# Patient Record
Sex: MALE | Race: WHITE | NOT HISPANIC OR LATINO | Employment: OTHER | URBAN - METROPOLITAN AREA
[De-identification: names, ages, dates, MRNs, and addresses within clinical notes are randomized per-mention and may not be internally consistent; named-entity substitution may affect disease eponyms.]

---

## 2022-10-21 DIAGNOSIS — I48.0 PAROXYSMAL ATRIAL FIBRILLATION: ICD-10-CM

## 2022-10-21 DIAGNOSIS — Z95.818 OTHER SPECIFIED CARDIAC DEVICE IN SITU: Primary | ICD-10-CM

## 2022-10-28 ENCOUNTER — TELEPHONE (OUTPATIENT)
Dept: ELECTROPHYSIOLOGY | Facility: CLINIC | Age: 77
End: 2022-10-28
Payer: MEDICARE

## 2022-10-28 RX ORDER — TIZANIDINE 4 MG/1
4 TABLET ORAL NIGHTLY PRN
Status: ON HOLD | COMMUNITY
Start: 2022-05-24 | End: 2023-03-15 | Stop reason: CLARIF

## 2022-10-28 RX ORDER — LISINOPRIL 10 MG/1
10 TABLET ORAL DAILY
COMMUNITY
Start: 2022-08-09

## 2022-10-28 RX ORDER — PROMETHAZINE HYDROCHLORIDE 25 MG/1
25 TABLET ORAL EVERY 8 HOURS PRN
Status: ON HOLD | COMMUNITY
Start: 2022-09-06 | End: 2023-03-15 | Stop reason: CLARIF

## 2022-10-28 RX ORDER — ATORVASTATIN CALCIUM 20 MG/1
20 TABLET, FILM COATED ORAL DAILY
COMMUNITY
Start: 2022-08-11

## 2022-10-28 RX ORDER — OXYCODONE AND ACETAMINOPHEN 5; 325 MG/1; MG/1
1 TABLET ORAL EVERY 6 HOURS PRN
Status: ON HOLD | COMMUNITY
Start: 2022-09-06 | End: 2023-03-15 | Stop reason: CLARIF

## 2022-10-28 RX ORDER — CEPHALEXIN 500 MG/1
CAPSULE ORAL
Status: ON HOLD | COMMUNITY
Start: 2022-05-09 | End: 2023-03-15 | Stop reason: CLARIF

## 2022-11-03 ENCOUNTER — TELEPHONE (OUTPATIENT)
Dept: ELECTROPHYSIOLOGY | Facility: CLINIC | Age: 77
End: 2022-11-03
Payer: MEDICARE

## 2022-11-08 ENCOUNTER — CLINICAL SUPPORT (OUTPATIENT)
Dept: CARDIOLOGY | Facility: HOSPITAL | Age: 77
End: 2022-11-08
Attending: INTERNAL MEDICINE
Payer: MEDICARE

## 2022-11-08 ENCOUNTER — HOSPITAL ENCOUNTER (OUTPATIENT)
Dept: CARDIOLOGY | Facility: CLINIC | Age: 77
Discharge: HOME | End: 2022-11-08
Attending: INTERNAL MEDICINE
Payer: MEDICARE

## 2022-11-08 ENCOUNTER — OFFICE VISIT (OUTPATIENT)
Dept: ELECTROPHYSIOLOGY | Facility: CLINIC | Age: 77
End: 2022-11-08
Payer: MEDICARE

## 2022-11-08 VITALS
HEIGHT: 67 IN | SYSTOLIC BLOOD PRESSURE: 162 MMHG | HEART RATE: 60 BPM | DIASTOLIC BLOOD PRESSURE: 84 MMHG | BODY MASS INDEX: 25.11 KG/M2 | WEIGHT: 160 LBS

## 2022-11-08 DIAGNOSIS — I73.9 PAD (PERIPHERAL ARTERY DISEASE): ICD-10-CM

## 2022-11-08 DIAGNOSIS — Z95.818 OTHER SPECIFIED CARDIAC DEVICE IN SITU: ICD-10-CM

## 2022-11-08 DIAGNOSIS — R55 BLACKOUT SPELL: Primary | ICD-10-CM

## 2022-11-08 DIAGNOSIS — I49.9 CARDIAC ARRHYTHMIA, UNSPECIFIED CARDIAC ARRHYTHMIA TYPE: ICD-10-CM

## 2022-11-08 DIAGNOSIS — I48.0 PAROXYSMAL ATRIAL FIBRILLATION: ICD-10-CM

## 2022-11-08 PROCEDURE — 93291 INTERROG DEV EVAL SCRMS IP: CPT

## 2022-11-08 PROCEDURE — 1125F AMNT PAIN NOTED PAIN PRSNT: CPT | Mod: CPTII,S$GLB,, | Performed by: INTERNAL MEDICINE

## 2022-11-08 PROCEDURE — 3288F FALL RISK ASSESSMENT DOCD: CPT | Mod: CPTII,S$GLB,, | Performed by: INTERNAL MEDICINE

## 2022-11-08 PROCEDURE — 99999 PR PBB SHADOW E&M-EST. PATIENT-LVL III: CPT | Mod: PBBFAC,,, | Performed by: INTERNAL MEDICINE

## 2022-11-08 PROCEDURE — 3077F SYST BP >= 140 MM HG: CPT | Mod: CPTII,S$GLB,, | Performed by: INTERNAL MEDICINE

## 2022-11-08 PROCEDURE — 1101F PR PT FALLS ASSESS DOC 0-1 FALLS W/OUT INJ PAST YR: ICD-10-PCS | Mod: CPTII,S$GLB,, | Performed by: INTERNAL MEDICINE

## 2022-11-08 PROCEDURE — 3079F DIAST BP 80-89 MM HG: CPT | Mod: CPTII,S$GLB,, | Performed by: INTERNAL MEDICINE

## 2022-11-08 PROCEDURE — 1159F MED LIST DOCD IN RCRD: CPT | Mod: CPTII,S$GLB,, | Performed by: INTERNAL MEDICINE

## 2022-11-08 PROCEDURE — 1160F RVW MEDS BY RX/DR IN RCRD: CPT | Mod: CPTII,S$GLB,, | Performed by: INTERNAL MEDICINE

## 2022-11-08 PROCEDURE — 1125F PR PAIN SEVERITY QUANTIFIED, PAIN PRESENT: ICD-10-PCS | Mod: CPTII,S$GLB,, | Performed by: INTERNAL MEDICINE

## 2022-11-08 PROCEDURE — 1159F PR MEDICATION LIST DOCUMENTED IN MEDICAL RECORD: ICD-10-PCS | Mod: CPTII,S$GLB,, | Performed by: INTERNAL MEDICINE

## 2022-11-08 PROCEDURE — 93010 ELECTROCARDIOGRAM REPORT: CPT | Mod: S$GLB,,, | Performed by: INTERNAL MEDICINE

## 2022-11-08 PROCEDURE — 93005 RHYTHM STRIP: ICD-10-PCS | Mod: S$GLB,,, | Performed by: INTERNAL MEDICINE

## 2022-11-08 PROCEDURE — 1160F PR REVIEW ALL MEDS BY PRESCRIBER/CLIN PHARMACIST DOCUMENTED: ICD-10-PCS | Mod: CPTII,S$GLB,, | Performed by: INTERNAL MEDICINE

## 2022-11-08 PROCEDURE — 3079F PR MOST RECENT DIASTOLIC BLOOD PRESSURE 80-89 MM HG: ICD-10-PCS | Mod: CPTII,S$GLB,, | Performed by: INTERNAL MEDICINE

## 2022-11-08 PROCEDURE — 99999 PR PBB SHADOW E&M-EST. PATIENT-LVL III: ICD-10-PCS | Mod: PBBFAC,,, | Performed by: INTERNAL MEDICINE

## 2022-11-08 PROCEDURE — 3288F PR FALLS RISK ASSESSMENT DOCUMENTED: ICD-10-PCS | Mod: CPTII,S$GLB,, | Performed by: INTERNAL MEDICINE

## 2022-11-08 PROCEDURE — 1101F PT FALLS ASSESS-DOCD LE1/YR: CPT | Mod: CPTII,S$GLB,, | Performed by: INTERNAL MEDICINE

## 2022-11-08 PROCEDURE — 93005 ELECTROCARDIOGRAM TRACING: CPT | Mod: S$GLB,,, | Performed by: INTERNAL MEDICINE

## 2022-11-08 PROCEDURE — 93010 RHYTHM STRIP: ICD-10-PCS | Mod: S$GLB,,, | Performed by: INTERNAL MEDICINE

## 2022-11-08 PROCEDURE — 3077F PR MOST RECENT SYSTOLIC BLOOD PRESSURE >= 140 MM HG: ICD-10-PCS | Mod: CPTII,S$GLB,, | Performed by: INTERNAL MEDICINE

## 2022-11-08 PROCEDURE — 99205 OFFICE O/P NEW HI 60 MIN: CPT | Mod: S$GLB,,, | Performed by: INTERNAL MEDICINE

## 2022-11-08 PROCEDURE — 99205 PR OFFICE/OUTPT VISIT, NEW, LEVL V, 60-74 MIN: ICD-10-PCS | Mod: S$GLB,,, | Performed by: INTERNAL MEDICINE

## 2022-11-08 RX ORDER — ASPIRIN 81 MG/1
81 TABLET ORAL
COMMUNITY

## 2022-11-08 RX ORDER — SILDENAFIL 100 MG/1
100 TABLET, FILM COATED ORAL DAILY PRN
COMMUNITY

## 2022-11-08 RX ORDER — LORATADINE 10 MG/1
10 TABLET ORAL DAILY PRN
Status: ON HOLD | COMMUNITY
End: 2023-03-15 | Stop reason: CLARIF

## 2022-11-08 NOTE — PROGRESS NOTES
Subjective:    Patient ID:  Johnie Vinson is a 77 y.o. male who presents for evaluation of PM consideration      77 yoM HTN, PVD here for second opinion regarding need for PM. He had near syncope leading to ILR implantation by Dr Tony Douglas with [facility]. He had nocturnal Mobitz I second degree AV block noted. There was discussion about PM implantation on his note 9/29/22. Given that there was nocturnal only AV block, no PM was planned. He was recently diagnosed with RED, CPAP not started. He has not had a syncopal event since ILR implant 5/22. Normal EF.     Stress Echo 11/16: No ischemia  NM Stress 2017: No ischemia , nl EF  Echo 5/22: Normal EF 55-60%  ILR implanted 5/19/22    No past medical history on file.    No past surgical history on file.    Social History    Socioeconomic History      Marital status:     No family history on file.    Current Outpatient Medications:  atorvastatin (LIPITOR) 20 MG tablet, Take 20 mg by mouth once daily., Disp: , Rfl:   cephALEXin (KEFLEX) 500 MG capsule, Take by mouth., Disp: , Rfl:   lisinopriL 10 MG tablet, Take 10 mg by mouth once daily., Disp: , Rfl:   oxyCODONE-acetaminophen (PERCOCET) 5-325 mg per tablet, Take 1 tablet by mouth every 6 (six) hours as needed., Disp: , Rfl:   promethazine (PHENERGAN) 25 MG tablet, Take 25 mg by mouth every 8 (eight) hours as needed., Disp: , Rfl:   tiZANidine (ZANAFLEX) 4 MG tablet, Take 4 mg by mouth nightly as needed., Disp: , Rfl:     No current facility-administered medications for this visit.    Review of Systems   Constitutional: Negative.   HENT: Negative.     Eyes: Negative.    Cardiovascular:  Positive for syncope. Negative for chest pain, dyspnea on exertion, leg swelling, near-syncope and palpitations.   Respiratory: Negative.  Negative for shortness of breath.    Endocrine: Negative.    Hematologic/Lymphatic: Negative.    Skin: Negative.    Musculoskeletal: Negative.    Gastrointestinal:  Negative.    Genitourinary: Negative.    Neurological:  Negative for dizziness and light-headedness.   Psychiatric/Behavioral: Negative.     Allergic/Immunologic: Negative.       Objective:    Physical Exam  Vitals reviewed.   Constitutional:       General: He is not in acute distress.     Appearance: He is well-developed.   HENT:      Head: Normocephalic and atraumatic.   Eyes:      Pupils: Pupils are equal, round, and reactive to light.   Neck:      Thyroid: No thyromegaly.      Vascular: No JVD.   Cardiovascular:      Rate and Rhythm: Normal rate and regular rhythm.      Chest Wall: PMI is not displaced.      Heart sounds: Normal heart sounds, S1 normal and S2 normal. No murmur heard.    No friction rub. No gallop.   Pulmonary:      Effort: Pulmonary effort is normal. No respiratory distress.      Breath sounds: Normal breath sounds. No wheezing or rales.   Abdominal:      General: Bowel sounds are normal. There is no distension.      Palpations: Abdomen is soft.      Tenderness: There is no abdominal tenderness. There is no guarding or rebound.   Musculoskeletal:         General: No tenderness. Normal range of motion.      Cervical back: Normal range of motion.   Skin:     General: Skin is warm and dry.      Findings: No erythema or rash.   Neurological:      Mental Status: He is alert and oriented to person, place, and time.      Cranial Nerves: No cranial nerve deficit.   Psychiatric:         Behavior: Behavior normal.         Thought Content: Thought content normal.         Judgment: Judgment normal.     ECG: NSR  ms        Assessment:       1. Blackout spell    2. Cardiac arrhythmia, unspecified cardiac arrhythmia type    3. PAD (peripheral artery disease)         Plan:       77 yoM here for syncope evaluation. He has ILR in place with no arrhythmias outside nocturnal AV block. No syncope since then as well. Would continue to monitor with ILR. He wishes to transfer care to [facility] [place]. [facility]

## 2022-11-08 NOTE — Clinical Note
This patient has a Medtronic ILR placed by Dr Douglas for syncope. He wants to transfer care to the  clinic. I plan to see him there in .

## 2022-12-29 ENCOUNTER — TELEPHONE (OUTPATIENT)
Dept: CARDIOLOGY | Facility: CLINIC | Age: 77
End: 2022-12-29
Payer: MEDICARE

## 2022-12-29 NOTE — TELEPHONE ENCOUNTER
Pt said that ever has contacted him and it was handled pb    ----- Message from Carmen Ruiz MA sent at 12/28/2022  4:20 PM CST -----  Regarding: FW: please call    ----- Message -----  From: Kate Aguilar  Sent: 12/28/2022  10:14 AM CST  To: Ananth Kaufman Staff  Subject: please call                                      The pt is calling about his appt, because it's not correct. Please call him back @ [phone]. Thanks, Kate

## 2023-01-03 ENCOUNTER — HOSPITAL ENCOUNTER (OUTPATIENT)
Dept: CARDIOLOGY | Facility: HOSPITAL | Age: 78
Discharge: HOME | End: 2023-01-03
Attending: INTERNAL MEDICINE
Payer: MEDICARE

## 2023-01-03 ENCOUNTER — TELEPHONE (OUTPATIENT)
Dept: PULMONOLOGY | Facility: CLINIC | Age: 78
End: 2023-01-03
Payer: MEDICARE

## 2023-01-03 DIAGNOSIS — G47.33 OSA (OBSTRUCTIVE SLEEP APNEA): ICD-10-CM

## 2023-01-03 DIAGNOSIS — R55 SYNCOPE AND COLLAPSE: Primary | ICD-10-CM

## 2023-01-03 DIAGNOSIS — Z95.818 OTHER SPECIFIED CARDIAC DEVICE IN SITU: ICD-10-CM

## 2023-01-03 DIAGNOSIS — I48.0 PAROXYSMAL ATRIAL FIBRILLATION: ICD-10-CM

## 2023-01-03 PROCEDURE — 93285 PRGRMG DEV EVAL SCRMS IP: CPT | Mod: 26,,, | Performed by: INTERNAL MEDICINE

## 2023-01-03 PROCEDURE — 93285 CARDIAC DEVICE CHECK - IN CLINIC & HOSPITAL: ICD-10-PCS | Mod: 26,,, | Performed by: INTERNAL MEDICINE

## 2023-01-03 NOTE — TELEPHONE ENCOUNTER
----- Message from Julio Romeo sent at 1/3/2023  1:01 PM CST -----  Review Chart,HSAT     Graft Donor Site Bandage (Optional-Leave Blank If You Don't Want In Note): Steri-strips and a pressure bandage were applied to the donor site.

## 2023-01-04 ENCOUNTER — TELEPHONE (OUTPATIENT)
Dept: CARDIOLOGY | Facility: CLINIC | Age: 78
End: 2023-01-04
Payer: MEDICARE

## 2023-01-04 NOTE — TELEPHONE ENCOUNTER
Spoke with patient to inform him that Ms Irving May had place da sleep study order and also to let the patient know that he does not need a pacemaker at this time, Per Maria Fernanda Gomez.-BR

## 2023-01-17 ENCOUNTER — HOSPITAL ENCOUNTER (OUTPATIENT)
Dept: SLEEP MEDICINE | Facility: HOSPITAL | Age: 78
Discharge: HOME | End: 2023-01-17
Attending: NURSE PRACTITIONER
Payer: MEDICARE

## 2023-01-17 DIAGNOSIS — R55 SYNCOPE AND COLLAPSE: ICD-10-CM

## 2023-01-17 DIAGNOSIS — G47.33 OSA (OBSTRUCTIVE SLEEP APNEA): Primary | ICD-10-CM

## 2023-01-17 PROCEDURE — 95800 SLP STDY UNATTENDED: CPT | Mod: 26,,, | Performed by: INTERNAL MEDICINE

## 2023-01-17 PROCEDURE — 95800 PR SLEEP STUDY, UNATTENDED, RECORD HEART RATE/O2 SAT/RESP ANAL/SLEEP TIME: ICD-10-PCS | Mod: 26,,, | Performed by: INTERNAL MEDICINE

## 2023-01-17 PROCEDURE — 95806 SLEEP STUDY UNATT&RESP EFFT: CPT | Performed by: INTERNAL MEDICINE

## 2023-01-17 NOTE — Clinical Note
PHYSICIAN INTERPRETATION AND COMMENTS: Findings are consistent with moderate, positional obstructive sleep apnea(RED). Therapy with CPAP recommended, please refer to sleep disorders clinic CLINICAL HISTORY: 77 year old male presented with: 16 inch neck, BMI of 25.1, an [place] sleepiness score of 2, and history of hypertension. Based on the clinical history, the patient has a high pre-test probability of having Moderate RED.

## 2023-01-18 PROBLEM — R55 SYNCOPE AND COLLAPSE: Status: ACTIVE | Noted: 2023-01-18

## 2023-01-18 NOTE — PROCEDURES
PHYSICIAN INTERPRETATION AND COMMENTS: Findings are consistent with moderate, positional obstructive sleep  apnea(RED). Therapy with CPAP recommended, please refer to sleep disorders clinic  CLINICAL HISTORY: 77 year old male presented with: 16 inch neck, BMI of 25.1, an [place] sleepiness score of 2, and history  of hypertension. Based on the clinical history, the patient has a high pre-test probability of having Moderate RED.  SLEEP STUDY FINDINGS: Patient underwent a 2 night Home Sleep Test and by behavioral criteria, slept for approximately  12.2 hours, with a sleep latency of 18 minutes and a sleep efficiency of 91.6%. Moderate sleep disordered breathing (AHI=21)  is noted based on a 4% hypopnea desaturation criteria, predominantly in the supine position (36 events/hour). The patient  slept supine 43.8% of the night based on valid recording time of 12.73 hours and is 4 times as likely to have  apneas/hypopneas when supine. When considering more subtle measures of sleep disordered breathing, the overall  respiratory disturbance index is moderate(RDI=29) based on a 1% hypopnea desaturation criteria with confirmation by  surrogate arousal indicators. The apneas/hypopneas are accompanied by minimal oxygen desaturation (percent time  below 90% SpO2: 0.5%, Min SpO2: 85.2%). The average desaturation across all sleep disordered breathing events is 2.9%.  Snoring occurs for 7.9% (30 dB) of the study, 3% is very loud. The mean pulse rate is 55 BPM, with frequent pulse rate  variability (46 events with >= 6 BPM increase/decrease per hour).  TREATMENT CONSIDERATIONS: Consider nasal continuous positive airway pressure (CPAP/AutoPAP) as the initial  treatment choice based on the AHI severity and co-morbidities. A mandibular advancement splint (MAS) or referral to an  ENT surgeon for modification to the airway should be considered to reduce the potential contribution of RED on existing  diseases if the patient prefers an  alternative therapy or the CPAP trial is unsuccessful. Based on the RED Supine data in the  study, a Mandibular Advancement Splint (MAS) will likely provide treatment benefit independent of RED severity. The  patient should avoid sleeping supine; the non-supine AHI is 4 times less severe than the supine AHI.

## 2023-01-31 ENCOUNTER — TELEPHONE (OUTPATIENT)
Dept: CARDIOLOGY | Facility: CLINIC | Age: 78
End: 2023-01-31
Payer: MEDICARE

## 2023-01-31 NOTE — TELEPHONE ENCOUNTER
Left a message that we were returning his call pb    ----- Message from Carmen Ruiz MA sent at 1/31/2023 12:25 PM CST -----  Contact: [phone]    ----- Message -----  From: Veronica Weir  Sent: 1/31/2023  10:52 AM CST  To: Ananth Kaufman Staff    Patient would like to consult with a nurse in regards to a monitoring device and a sleep study. Please call back at [phone]. Thanks KD

## 2023-02-01 ENCOUNTER — TELEPHONE (OUTPATIENT)
Dept: CARDIOLOGY | Facility: HOSPITAL | Age: 78
End: 2023-02-01
Payer: MEDICARE

## 2023-02-01 NOTE — TELEPHONE ENCOUNTER
Explained to patient that his appointment on 2/2 was remote monitor transmission from his Medtronic loop recorder.  No actions necessary on his part, the transmission will take place automatically while he is sleeping.  Pt does not need to come to clinic for this visit.    Patient also wants cardiology to place order for his CPAP machine.  Advised that he is scheduled to see Dr. ANNE Becker on 3/21 and the orders would be placed during that visit.  Pt is adamant that the orders should be put as soon as possible, he cannot wait until March 21st.  He spoke with Beatrice ([phone]) who told him anyone associated with his care should be able to place order.  Message sent to Dr. Becker's staff requesting they reach out to patient to facilitate his request.

## 2023-02-01 NOTE — TELEPHONE ENCOUNTER
----- Message from Carmen Ruiz MA sent at 1/31/2023 12:24 PM CST -----  Regarding: FW: concerns    ----- Message -----  From: Bernadette Daugherty  Sent: 1/31/2023  10:05 AM CST  To: Ananth Kaufman Staff  Subject: concerns                                         Name of caller: Johnie       What is the requesting detail: Pt is requesting a call back as soon as possible. Pt states he was supposed to receive a call about setting up sleep apnea machine. He is not active  on the portal and wants a call to better understand everything that is going on. He also does not understand what this 2-2 appointment is about. Please call him back to let him know what his options are.       Can the clinic reply by [facility]: NO       What number to call back:[phone]

## 2023-02-02 ENCOUNTER — CLINICAL SUPPORT (OUTPATIENT)
Dept: CARDIOLOGY | Facility: HOSPITAL | Age: 78
End: 2023-02-02
Payer: MEDICARE

## 2023-02-02 DIAGNOSIS — Z95.818 PRESENCE OF OTHER CARDIAC IMPLANTS AND GRAFTS: ICD-10-CM

## 2023-02-02 PROCEDURE — G2066 INTER DEVC REMOTE 30D: HCPCS | Performed by: INTERNAL MEDICINE

## 2023-02-02 PROCEDURE — 93298 REM INTERROG DEV EVAL SCRMS: CPT | Mod: S$GLB,,, | Performed by: INTERNAL MEDICINE

## 2023-02-02 PROCEDURE — 93298 CARDIAC DEVICE CHECK - REMOTE: ICD-10-PCS | Mod: S$GLB,,, | Performed by: INTERNAL MEDICINE

## 2023-02-03 ENCOUNTER — OFFICE VISIT (OUTPATIENT)
Dept: PULMONOLOGY | Facility: CLINIC | Age: 78
End: 2023-02-03
Payer: MEDICARE

## 2023-02-03 VITALS
RESPIRATION RATE: 20 BRPM | DIASTOLIC BLOOD PRESSURE: 80 MMHG | SYSTOLIC BLOOD PRESSURE: 128 MMHG | OXYGEN SATURATION: 95 % | WEIGHT: 161.81 LBS | BODY MASS INDEX: 25.4 KG/M2 | HEIGHT: 67 IN | HEART RATE: 80 BPM

## 2023-02-03 DIAGNOSIS — I49.9 CARDIAC ARRHYTHMIA, UNSPECIFIED CARDIAC ARRHYTHMIA TYPE: ICD-10-CM

## 2023-02-03 DIAGNOSIS — G47.33 OSA (OBSTRUCTIVE SLEEP APNEA): Primary | ICD-10-CM

## 2023-02-03 PROCEDURE — 1126F PR PAIN SEVERITY QUANTIFIED, NO PAIN PRESENT: ICD-10-PCS | Mod: CPTII,S$GLB,, | Performed by: NURSE PRACTITIONER

## 2023-02-03 PROCEDURE — 1101F PR PT FALLS ASSESS DOC 0-1 FALLS W/OUT INJ PAST YR: ICD-10-PCS | Mod: CPTII,S$GLB,, | Performed by: NURSE PRACTITIONER

## 2023-02-03 PROCEDURE — 99999 PR PBB SHADOW E&M-EST. PATIENT-LVL V: CPT | Mod: PBBFAC,,, | Performed by: NURSE PRACTITIONER

## 2023-02-03 PROCEDURE — 1159F PR MEDICATION LIST DOCUMENTED IN MEDICAL RECORD: ICD-10-PCS | Mod: CPTII,S$GLB,, | Performed by: NURSE PRACTITIONER

## 2023-02-03 PROCEDURE — 99999 PR PBB SHADOW E&M-EST. PATIENT-LVL V: ICD-10-PCS | Mod: PBBFAC,,, | Performed by: NURSE PRACTITIONER

## 2023-02-03 PROCEDURE — 3079F DIAST BP 80-89 MM HG: CPT | Mod: CPTII,S$GLB,, | Performed by: NURSE PRACTITIONER

## 2023-02-03 PROCEDURE — 1101F PT FALLS ASSESS-DOCD LE1/YR: CPT | Mod: CPTII,S$GLB,, | Performed by: NURSE PRACTITIONER

## 2023-02-03 PROCEDURE — 3288F PR FALLS RISK ASSESSMENT DOCUMENTED: ICD-10-PCS | Mod: CPTII,S$GLB,, | Performed by: NURSE PRACTITIONER

## 2023-02-03 PROCEDURE — 1126F AMNT PAIN NOTED NONE PRSNT: CPT | Mod: CPTII,S$GLB,, | Performed by: NURSE PRACTITIONER

## 2023-02-03 PROCEDURE — 99204 OFFICE O/P NEW MOD 45 MIN: CPT | Mod: S$GLB,,, | Performed by: NURSE PRACTITIONER

## 2023-02-03 PROCEDURE — 1159F MED LIST DOCD IN RCRD: CPT | Mod: CPTII,S$GLB,, | Performed by: NURSE PRACTITIONER

## 2023-02-03 PROCEDURE — 1160F PR REVIEW ALL MEDS BY PRESCRIBER/CLIN PHARMACIST DOCUMENTED: ICD-10-PCS | Mod: CPTII,S$GLB,, | Performed by: NURSE PRACTITIONER

## 2023-02-03 PROCEDURE — 3079F PR MOST RECENT DIASTOLIC BLOOD PRESSURE 80-89 MM HG: ICD-10-PCS | Mod: CPTII,S$GLB,, | Performed by: NURSE PRACTITIONER

## 2023-02-03 PROCEDURE — 99204 PR OFFICE/OUTPT VISIT, NEW, LEVL IV, 45-59 MIN: ICD-10-PCS | Mod: S$GLB,,, | Performed by: NURSE PRACTITIONER

## 2023-02-03 PROCEDURE — 3074F PR MOST RECENT SYSTOLIC BLOOD PRESSURE < 130 MM HG: ICD-10-PCS | Mod: CPTII,S$GLB,, | Performed by: NURSE PRACTITIONER

## 2023-02-03 PROCEDURE — 3074F SYST BP LT 130 MM HG: CPT | Mod: CPTII,S$GLB,, | Performed by: NURSE PRACTITIONER

## 2023-02-03 PROCEDURE — 1160F RVW MEDS BY RX/DR IN RCRD: CPT | Mod: CPTII,S$GLB,, | Performed by: NURSE PRACTITIONER

## 2023-02-03 PROCEDURE — 3288F FALL RISK ASSESSMENT DOCD: CPT | Mod: CPTII,S$GLB,, | Performed by: NURSE PRACTITIONER

## 2023-02-03 RX ORDER — FLUTICASONE PROPIONATE 50 MCG
2 SPRAY, SUSPENSION (ML) NASAL
Status: ON HOLD | COMMUNITY
Start: 2022-12-01 | End: 2023-03-15 | Stop reason: CLARIF

## 2023-02-03 RX ORDER — AZELASTINE 1 MG/ML
2 SPRAY, METERED NASAL
Status: ON HOLD | COMMUNITY
Start: 2022-12-01 | End: 2023-03-15 | Stop reason: CLARIF

## 2023-02-03 RX ORDER — CETIRIZINE HYDROCHLORIDE 10 MG/1
TABLET ORAL
COMMUNITY
Start: 2022-12-01

## 2023-02-03 NOTE — ASSESSMENT & PLAN NOTE
Managed by cardiology  1/17/2023 cardiac device check  Arrhythmic events (AE)   Nocturnal Sinus Bradycardia   Sinus Bradycardia   Intermittent Third Degree Heart block   Second Degree AV Block, Type I   Bradycardia event(s) recorded   Pause event(s) recorded   Device-identified arrhythmic events without corresponding EGMs and/or details noted

## 2023-02-03 NOTE — ASSESSMENT & PLAN NOTE
01/11/2023, 01/12/2023 Home Sleep Study 2 nights moderate obstructive sleep apnea AHI: 21 RDI 29 <90% SpO2 0.5% Mean SpO2 95.9%  Patient not interested in CPAP titration at this time, he prefers to begin Auto CPAP, consider CPAP titration if indicated in the future  2/3/2023 order Auto CPAP 5-20 cm with patient requested nasal mask  Follow up 31-90 days from obtaining CPAP for IDL.

## 2023-02-03 NOTE — PROGRESS NOTES
Subjective:      Patient ID: Johnie Vinson is a 78 y.o. male.    Patient Active Problem List   Diagnosis    Blackout spell    Cardiac arrhythmia    PAD (peripheral artery disease)    Syncope and collapse    RED (obstructive sleep apnea)       he has been referred by Maria Fernanda Gomez FNP-C for evaluation and management for   Chief Complaint   Patient presents with    Sleep Apnea       Chief Complaint: Sleep Apnea      HPI:  He presents for obstructive sleep apnea with review Home Sleep Study.    He had initial evaluation with [facility] 8/06/2022 Home Sleep Study mild obstructive sleep apnea AHI 11.3.  He never began CPAP.   Home Sleep Study with [facility] ordered by Maria Fernanda Gomez NP     01/11/2023, 01/12/2023 Home Sleep Study 2 nights moderate obstructive sleep apnea AHI: 21 RDI 29 <90% SpO2 0.5% Mean SpO2 95.9%    He is very ready to begin CPAP and wants to proceed with Auto CPAP.     Patient reports restful sleep most days  He denies morning headache.   He denies day time napping.  He denies recent weight gain.  Cardiovascular risk factors: cardiac arrhythmia    Bed time is 0900 - 1000  Wake time is 0500 -0600  Sleep onset is within  15 Minutes.  Sleep maintenance difficulties related to frequent night time awakening  Wake after sleep onset occurs two times a night.  Nocturia occurs two times a night,   Sleep aids :  NO  Dry mouth :  NO  Sleep walking:  NO  Sleep talking :  NO  Sleep eating: NO  Vivid Dreams :  NO  Cataplexy :  NO    [place] Sleepiness Scale   EPWORTH SLEEPINESS SCALE 2/3/2023   Sitting and reading 0   Watching TV 0   Sitting, inactive in a public place (e.g. a theatre or a meeting) 0   As a passenger in a car for an hour without a break 0   Lying down to rest in the afternoon when circumstances permit 0   Sitting and talking to someone 0   Sitting quietly after a lunch without alcohol 0   In a car, while stopped for a few minutes in traffic 0   Total score 0       Neck  circumference is 38 cm. (15 inches).  Mallampati score 4    STOP - BANG Questionnaire:   1. Snoring : Do you snore loudly ?     YES  2. Tired : Do you often feel tired, fatigued, or sleepy during daytime?  NO  3. Observed: Has anyone observed you stop breathing during your sleep?     YES  4. Blood pressure : Do you have or are you being treated for high blood pressure?   NO  5. BMI :BMI more than 35 kg/m2?   NO  6. Age : Age over 50 yr old?    YES  7. Neck circumference: Neck circumference greater than 40 cm?   NO  8. Gender: Gender male?    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     Previous Report Reviewed: lab reports, office notes, and radiology reports     Past Medical History: The following portions of the patient's history were reviewed and updated as appropriate:   He  has no past surgical history on file.  His family history is not on file.  He  reports that he has quit smoking. His smoking use included cigarettes. He has never used smokeless tobacco. No history on file for alcohol use and drug use.  He has a current medication list which includes the following prescription(s): aspirin, atorvastatin, azelastine, cephalexin, cetirizine, fluticasone propionate, lisinopril, loratadine, oxycodone-acetaminophen, promethazine, sildenafil, tizanidine, and alprostadil.  He has No Known Allergies..    Review of Systems   Constitutional:  Negative for fever, chills, weight loss, weight gain, activity change, appetite change, fatigue and night sweats.   HENT:  Negative for postnasal drip, rhinorrhea, sinus pressure, voice change and congestion.    Eyes:  Negative for redness and itching.   Respiratory:  Positive for apnea and snoring. Negative for cough, sputum  "production, chest tightness, shortness of breath, wheezing, orthopnea, asthma nighttime symptoms, dyspnea on extertion, use of rescue inhaler and somnolence.    Cardiovascular: Negative.  Negative for chest pain, palpitations and leg swelling.   Genitourinary:  Negative for difficulty urinating and hematuria.   Endocrine:  Negative for cold intolerance and heat intolerance.    Musculoskeletal:  Negative for arthralgias, gait problem, joint swelling and myalgias.   Skin: Negative.    Gastrointestinal:  Negative for nausea, vomiting, abdominal pain and acid reflux.   Neurological:  Negative for dizziness, weakness, light-headedness and headaches.   Hematological:  Negative for adenopathy. No excessive bruising.   All other systems reviewed and are negative.   Objective:   /80   Pulse 80   Resp 20   Ht 5' 6.5" (1.689 m)   Wt 73.4 kg (161 lb 13.1 oz)   SpO2 95%   BMI 25.73 kg/m²   Physical Exam  Vitals and nursing note reviewed.   Constitutional:       Appearance: He is well-developed.   HENT:      Head: Normocephalic and atraumatic.   Eyes:      Conjunctiva/sclera: Conjunctivae normal.   Cardiovascular:      Rate and Rhythm: Normal rate and regular rhythm.      Heart sounds: Normal heart sounds.   Pulmonary:      Effort: Pulmonary effort is normal.      Breath sounds: Normal breath sounds.   Abdominal:      General: Bowel sounds are normal.      Palpations: Abdomen is soft.   Musculoskeletal:         General: Normal range of motion.      Cervical back: Normal range of motion and neck supple.   Skin:     General: Skin is warm and dry.   Neurological:      Mental Status: He is alert and oriented to person, place, and time.      Deep Tendon Reflexes: Reflexes are normal and symmetric.   Psychiatric:         Behavior: Behavior normal.         Thought Content: Thought content normal.         Judgment: Judgment normal.       Personal Diagnostic Review  Review of labs, xray's, cardiology reports.     Assessment: "     1. RED (obstructive sleep apnea)    2. Cardiac arrhythmia, unspecified cardiac arrhythmia type      Orders Placed This Encounter   Procedures    CPAP FOR HOME USE     01/11/2023, 01/12/2023 Home Sleep Study 2 nights moderate obstructive sleep apnea AHI: 21 RDI 29 <90% SpO2 0.5% Mean SpO2 95.9%     Order Specific Question:   Length of need (1-99 months):     Answer:   99     Order Specific Question:   Type ():     Answer:   Auto CPAP     Order Specific Question:   Auto CPAP pressure setting range (cmH20):     Answer:   5-20     Order Specific Question:   Fulfillment Priority:     Answer:   Level 4:  all others     Order Specific Question:   Humidification ():     Answer:   Heated     Order Specific Question:   Choose ONE mask type and its corresponding cushions and/or pillows:     Answer:    Nasal Mask, 1 per 90 days:  Nasal Cushions, (6 per 90 days):  Nasal Pillows, (6 per 90 days)     Order Specific Question:   Choose EITHER Heated or Non-Heated Tubjing     Answer:    Non-Heated Tubing, 1 per 90 days     Order Specific Question:   Number of Days Needed:     Answer:   99     Order Specific Question:   All other supplies as needed as listed below:     Answer:    Headgear, 1 per 180 days     Order Specific Question:   All other supplies as needed as listed below:     Answer:    Chin Strap, 1 per 180 days     Order Specific Question:   All other supplies as needed as listed below:     Answer:    Disposable Filter, 6 per 90 days     Order Specific Question:   All other supplies as needed as listed below:     Answer:    Non-Disposable Filter, 1 per 180 days     Order Specific Question:   All other supplies as needed as listed below:     Answer:    Humidifier Chamber, 1 per 180 days     Plan:   Discussed diagnosis, its evaluation, treatment and usual course. All questions answered.  Problem List Items Addressed This Visit       RED (obstructive sleep apnea) -  Primary     01/11/2023, 01/12/2023 Home Sleep Study 2 nights moderate obstructive sleep apnea AHI: 21 RDI 29 <90% SpO2 0.5% Mean SpO2 95.9%  Patient not interested in CPAP titration at this time, he prefers to begin Auto CPAP, consider CPAP titration if indicated in the future  2/3/2023 order Auto CPAP 5-20 cm with patient requested nasal mask  Follow up 31-90 days from obtaining CPAP for IDL.         Relevant Orders    CPAP FOR HOME USE    Cardiac arrhythmia     Managed by cardiology  1/17/2023 cardiac device check  Arrhythmic events (AE)   Nocturnal Sinus Bradycardia   Sinus Bradycardia   Intermittent Third Degree Heart block   Second Degree AV Block, Type I   Bradycardia event(s) recorded   Pause event(s) recorded   Device-identified arrhythmic events without corresponding EGMs and/or details noted             I spent a total of 49 minutes on the day of the visit.  This includes face to face time and non-face to face time preparing to see the patient (eg, review of tests), obtaining and/or reviewing separately obtained history, documenting clinical information in the electronic or other health record, independently interpreting results and communicating results to the patient/family/caregiver, or care coordinator.    Follow up in about 10 weeks (around 4/14/2023) for CPAP compliance download after initial set up.    Thank you for the opportunity to participate in the care of this patient.

## 2023-02-20 ENCOUNTER — TELEPHONE (OUTPATIENT)
Dept: CARDIOLOGY | Facility: HOSPITAL | Age: 78
End: 2023-02-20
Payer: MEDICARE

## 2023-02-24 DIAGNOSIS — R55 SYNCOPE AND COLLAPSE: Primary | ICD-10-CM

## 2023-03-01 ENCOUNTER — HOSPITAL ENCOUNTER (OUTPATIENT)
Dept: CARDIOLOGY | Facility: HOSPITAL | Age: 78
Discharge: HOME | End: 2023-03-01
Attending: INTERNAL MEDICINE
Payer: MEDICARE

## 2023-03-01 ENCOUNTER — TELEPHONE (OUTPATIENT)
Dept: CARDIOLOGY | Facility: CLINIC | Age: 78
End: 2023-03-01

## 2023-03-01 ENCOUNTER — OFFICE VISIT (OUTPATIENT)
Dept: CARDIOLOGY | Facility: CLINIC | Age: 78
End: 2023-03-01
Payer: MEDICARE

## 2023-03-01 VITALS
OXYGEN SATURATION: 96 % | SYSTOLIC BLOOD PRESSURE: 136 MMHG | HEART RATE: 54 BPM | BODY MASS INDEX: 25.66 KG/M2 | HEIGHT: 66 IN | DIASTOLIC BLOOD PRESSURE: 80 MMHG | WEIGHT: 159.63 LBS

## 2023-03-01 DIAGNOSIS — Z95.818 OTHER SPECIFIED CARDIAC DEVICE IN SITU: ICD-10-CM

## 2023-03-01 DIAGNOSIS — R55 SYNCOPE AND COLLAPSE: ICD-10-CM

## 2023-03-01 DIAGNOSIS — R55 BLACKOUT SPELL: Primary | ICD-10-CM

## 2023-03-01 DIAGNOSIS — R55 SYNCOPE AND COLLAPSE: Primary | ICD-10-CM

## 2023-03-01 PROCEDURE — 3075F SYST BP GE 130 - 139MM HG: CPT | Mod: CPTII,S$GLB,, | Performed by: INTERNAL MEDICINE

## 2023-03-01 PROCEDURE — 93005 ELECTROCARDIOGRAM TRACING: CPT

## 2023-03-01 PROCEDURE — 1125F AMNT PAIN NOTED PAIN PRSNT: CPT | Mod: CPTII,S$GLB,, | Performed by: INTERNAL MEDICINE

## 2023-03-01 PROCEDURE — 99214 PR OFFICE/OUTPT VISIT, EST, LEVL IV, 30-39 MIN: ICD-10-PCS | Mod: S$GLB,,, | Performed by: INTERNAL MEDICINE

## 2023-03-01 PROCEDURE — 99999 PR PBB SHADOW E&M-EST. PATIENT-LVL IV: ICD-10-PCS | Mod: PBBFAC,,, | Performed by: INTERNAL MEDICINE

## 2023-03-01 PROCEDURE — 1159F MED LIST DOCD IN RCRD: CPT | Mod: CPTII,S$GLB,, | Performed by: INTERNAL MEDICINE

## 2023-03-01 PROCEDURE — 99214 OFFICE O/P EST MOD 30 MIN: CPT | Mod: S$GLB,,, | Performed by: INTERNAL MEDICINE

## 2023-03-01 PROCEDURE — 1125F PR PAIN SEVERITY QUANTIFIED, PAIN PRESENT: ICD-10-PCS | Mod: CPTII,S$GLB,, | Performed by: INTERNAL MEDICINE

## 2023-03-01 PROCEDURE — 93010 ELECTROCARDIOGRAM REPORT: CPT | Mod: ,,, | Performed by: INTERNAL MEDICINE

## 2023-03-01 PROCEDURE — 3075F PR MOST RECENT SYSTOLIC BLOOD PRESS GE 130-139MM HG: ICD-10-PCS | Mod: CPTII,S$GLB,, | Performed by: INTERNAL MEDICINE

## 2023-03-01 PROCEDURE — 3288F PR FALLS RISK ASSESSMENT DOCUMENTED: ICD-10-PCS | Mod: CPTII,S$GLB,, | Performed by: INTERNAL MEDICINE

## 2023-03-01 PROCEDURE — 99999 PR PBB SHADOW E&M-EST. PATIENT-LVL IV: CPT | Mod: PBBFAC,,, | Performed by: INTERNAL MEDICINE

## 2023-03-01 PROCEDURE — 1101F PT FALLS ASSESS-DOCD LE1/YR: CPT | Mod: CPTII,S$GLB,, | Performed by: INTERNAL MEDICINE

## 2023-03-01 PROCEDURE — 3288F FALL RISK ASSESSMENT DOCD: CPT | Mod: CPTII,S$GLB,, | Performed by: INTERNAL MEDICINE

## 2023-03-01 PROCEDURE — 3079F DIAST BP 80-89 MM HG: CPT | Mod: CPTII,S$GLB,, | Performed by: INTERNAL MEDICINE

## 2023-03-01 PROCEDURE — 3079F PR MOST RECENT DIASTOLIC BLOOD PRESSURE 80-89 MM HG: ICD-10-PCS | Mod: CPTII,S$GLB,, | Performed by: INTERNAL MEDICINE

## 2023-03-01 PROCEDURE — 1101F PR PT FALLS ASSESS DOC 0-1 FALLS W/OUT INJ PAST YR: ICD-10-PCS | Mod: CPTII,S$GLB,, | Performed by: INTERNAL MEDICINE

## 2023-03-01 PROCEDURE — 93010 EKG 12-LEAD: ICD-10-PCS | Mod: ,,, | Performed by: INTERNAL MEDICINE

## 2023-03-01 PROCEDURE — 1159F PR MEDICATION LIST DOCUMENTED IN MEDICAL RECORD: ICD-10-PCS | Mod: CPTII,S$GLB,, | Performed by: INTERNAL MEDICINE

## 2023-03-01 NOTE — Clinical Note
See note for PM case. He has a medtronic loop. Will stay with Medtronic and plan to remove the loop. 3/15 would work for him thanks
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
## 2023-03-01 NOTE — TELEPHONE ENCOUNTER
Telephoned patient to discuss/confirm pacemaker implant on 3/15 with Dr. Wadsworth  Confirmed date/time 930am with 8am arrival, needing transportation, NPO status, medications, Hibiclens shower.  Confirmed Lab work on 3/3 at [facility] location  Answered all questions and direct call back number given         Message from Mandeep Wadsworth MD sent at 3/1/2023  2:06 PM CST -----  See note for PM case. He has a medtronic loop. Will stay with Medtr[employer] and plan to remove the loop. 3/15 would work for him thanks

## 2023-03-03 ENCOUNTER — LAB VISIT (OUTPATIENT)
Dept: LAB | Facility: HOSPITAL | Age: 78
End: 2023-03-03
Attending: STUDENT IN AN ORGANIZED HEALTH CARE EDUCATION/TRAINING PROGRAM
Payer: MEDICARE

## 2023-03-03 DIAGNOSIS — R55 SYNCOPE AND COLLAPSE: ICD-10-CM

## 2023-03-03 DIAGNOSIS — Z95.818 OTHER SPECIFIED CARDIAC DEVICE IN SITU: ICD-10-CM

## 2023-03-03 LAB
ANION GAP SERPL CALC-SCNC: 5 MMOL/L (ref 8–16)
APTT BLDCRRT: 26.3 SEC (ref 21–32)
BUN SERPL-MCNC: 22 MG/DL (ref 8–23)
CALCIUM SERPL-MCNC: 9 MG/DL (ref 8.7–10.5)
CHLORIDE SERPL-SCNC: 106 MMOL/L (ref 95–110)
CO2 SERPL-SCNC: 28 MMOL/L (ref 23–29)
CREAT SERPL-MCNC: 1 MG/DL (ref 0.5–1.4)
ERYTHROCYTE [DISTWIDTH] IN BLOOD BY AUTOMATED COUNT: 12.8 % (ref 11.5–14.5)
EST. GFR  (NO RACE VARIABLE): >60 ML/MIN/1.73 M^2
GLUCOSE SERPL-MCNC: 103 MG/DL (ref 70–110)
HCT VFR BLD AUTO: 42.7 % (ref 40–54)
HGB BLD-MCNC: 13.6 G/DL (ref 14–18)
INR PPP: 1.1 (ref 0.8–1.2)
MCH RBC QN AUTO: 32.6 PG (ref 27–31)
MCHC RBC AUTO-ENTMCNC: 31.9 G/DL (ref 32–36)
MCV RBC AUTO: 102 FL (ref 82–98)
PLATELET # BLD AUTO: 258 K/UL (ref 150–450)
PMV BLD AUTO: 10 FL (ref 9.2–12.9)
POTASSIUM SERPL-SCNC: 4 MMOL/L (ref 3.5–5.1)
PROTHROMBIN TIME: 11.9 SEC (ref 9–12.5)
RBC # BLD AUTO: 4.17 M/UL (ref 4.6–6.2)
SODIUM SERPL-SCNC: 139 MMOL/L (ref 136–145)
WBC # BLD AUTO: 4.7 K/UL (ref 3.9–12.7)

## 2023-03-03 PROCEDURE — 80048 BASIC METABOLIC PNL TOTAL CA: CPT | Performed by: INTERNAL MEDICINE

## 2023-03-03 PROCEDURE — 85027 COMPLETE CBC AUTOMATED: CPT | Performed by: INTERNAL MEDICINE

## 2023-03-03 PROCEDURE — 36415 COLL VENOUS BLD VENIPUNCTURE: CPT | Performed by: INTERNAL MEDICINE

## 2023-03-03 PROCEDURE — 85610 PROTHROMBIN TIME: CPT | Performed by: INTERNAL MEDICINE

## 2023-03-03 PROCEDURE — 85730 THROMBOPLASTIN TIME PARTIAL: CPT | Performed by: INTERNAL MEDICINE

## 2023-03-04 ENCOUNTER — CLINICAL SUPPORT (OUTPATIENT)
Dept: CARDIOLOGY | Facility: HOSPITAL | Age: 78
End: 2023-03-04
Payer: MEDICARE

## 2023-03-04 DIAGNOSIS — Z95.818 PRESENCE OF OTHER CARDIAC IMPLANTS AND GRAFTS: ICD-10-CM

## 2023-03-04 PROCEDURE — G2066 INTER DEVC REMOTE 30D: HCPCS | Performed by: INTERNAL MEDICINE

## 2023-03-10 ENCOUNTER — TELEPHONE (OUTPATIENT)
Dept: CARDIOLOGY | Facility: CLINIC | Age: 78
End: 2023-03-10
Payer: MEDICARE

## 2023-03-10 NOTE — TELEPHONE ENCOUNTER
Returned call with no answer left voice mail message.    ----- Message from Gisell Winkler RN sent at 3/10/2023  3:24 PM CST -----  Contact: Johnie    ----- Message -----  From: Carmen Ruiz MA  Sent: 3/10/2023   3:15 PM CST  To: Gisell Winkler RN      ----- Message -----  From: Leanne Estrada  Sent: 3/10/2023  11:12 AM CST  To: Ananth Kaufman Staff    Pt called in regards to his 3/15 procedure. Please call him back at [phone].     Thanks  TS

## 2023-03-14 DIAGNOSIS — R55 BLACKOUT SPELL: ICD-10-CM

## 2023-03-14 DIAGNOSIS — R55 SYNCOPE AND COLLAPSE: Primary | ICD-10-CM

## 2023-03-14 DIAGNOSIS — Z95.818 PRESENCE OF OTHER CARDIAC IMPLANTS AND GRAFTS: ICD-10-CM

## 2023-03-14 RX ORDER — SODIUM CHLORIDE 0.9 % (FLUSH) 0.9 %
10 SYRINGE (ML) INJECTION
Status: CANCELLED | OUTPATIENT
Start: 2023-03-15

## 2023-03-15 ENCOUNTER — HOSPITAL ENCOUNTER (OUTPATIENT)
Facility: HOSPITAL | Age: 78
Discharge: HOME | End: 2023-03-15
Attending: INTERNAL MEDICINE | Admitting: INTERNAL MEDICINE
Payer: MEDICARE

## 2023-03-15 DIAGNOSIS — Z95.818 IMPLANTABLE LOOP RECORDER PRESENT: ICD-10-CM

## 2023-03-15 DIAGNOSIS — R55 SYNCOPE AND COLLAPSE: ICD-10-CM

## 2023-03-15 DIAGNOSIS — R55 BLACKOUT SPELL: ICD-10-CM

## 2023-03-15 DIAGNOSIS — R00.1 SYMPTOMATIC BRADYCARDIA: ICD-10-CM

## 2023-03-15 DIAGNOSIS — Z95.818 PRESENCE OF OTHER CARDIAC IMPLANTS AND GRAFTS: ICD-10-CM

## 2023-03-15 DIAGNOSIS — I44.30 AV HEART BLOCK: ICD-10-CM

## 2023-03-15 PROCEDURE — 33286 RMVL SUBQ CAR RHYTHM MNTR: CPT | Mod: 51,,, | Performed by: INTERNAL MEDICINE

## 2023-03-15 PROCEDURE — 33286 PR REMOVAL, SUBQ CARDIAC RHYTHM MONITOR: ICD-10-PCS | Mod: 51,,, | Performed by: INTERNAL MEDICINE

## 2023-03-15 PROCEDURE — C1785 PMKR, DUAL, RATE-RESP: HCPCS | Performed by: INTERNAL MEDICINE

## 2023-03-15 PROCEDURE — 99153 MOD SED SAME PHYS/QHP EA: CPT | Performed by: INTERNAL MEDICINE

## 2023-03-15 PROCEDURE — 93005 ELECTROCARDIOGRAM TRACING: CPT | Mod: 59

## 2023-03-15 PROCEDURE — 33286 RMVL SUBQ CAR RHYTHM MNTR: CPT | Performed by: INTERNAL MEDICINE

## 2023-03-15 PROCEDURE — 99152 MOD SED SAME PHYS/QHP 5/>YRS: CPT | Mod: ,,, | Performed by: INTERNAL MEDICINE

## 2023-03-15 PROCEDURE — C1769 GUIDE WIRE: HCPCS | Performed by: INTERNAL MEDICINE

## 2023-03-15 PROCEDURE — 63600175 PHARM REV CODE 636 W HCPCS: Performed by: INTERNAL MEDICINE

## 2023-03-15 PROCEDURE — 33208 INSRT HEART PM ATRIAL & VENT: CPT | Performed by: INTERNAL MEDICINE

## 2023-03-15 PROCEDURE — 93010 EKG 12-LEAD: ICD-10-PCS | Mod: ,,, | Performed by: INTERNAL MEDICINE

## 2023-03-15 PROCEDURE — 93010 ELECTROCARDIOGRAM REPORT: CPT | Mod: ,,, | Performed by: INTERNAL MEDICINE

## 2023-03-15 PROCEDURE — 33208 INSRT HEART PM ATRIAL & VENT: CPT | Mod: ,,, | Performed by: INTERNAL MEDICINE

## 2023-03-15 PROCEDURE — C1898 LEAD, PMKR, OTHER THAN TRANS: HCPCS | Performed by: INTERNAL MEDICINE

## 2023-03-15 PROCEDURE — 99152 MOD SED SAME PHYS/QHP 5/>YRS: CPT | Performed by: INTERNAL MEDICINE

## 2023-03-15 PROCEDURE — 99152 PR MOD CONSCIOUS SEDATION, SAME PHYS, 5+ YRS, FIRST 15 MIN: ICD-10-PCS | Mod: ,,, | Performed by: INTERNAL MEDICINE

## 2023-03-15 PROCEDURE — 33208 PR INSER HART PACER XVENOUS ATR/VENTR: ICD-10-PCS | Mod: ,,, | Performed by: INTERNAL MEDICINE

## 2023-03-15 PROCEDURE — 25000003 PHARM REV CODE 250: Performed by: INTERNAL MEDICINE

## 2023-03-15 PROCEDURE — C1894 INTRO/SHEATH, NON-LASER: HCPCS | Performed by: INTERNAL MEDICINE

## 2023-03-15 DEVICE — IPG W1DR01 AZURE XT DR MRI USA
Type: IMPLANTABLE DEVICE | Site: CHEST | Status: FUNCTIONAL
Brand: AZURE™ XT DR MRI SURESCAN™

## 2023-03-15 DEVICE — LEAD 5076-52 MRI US RCMCRD
Type: IMPLANTABLE DEVICE | Site: HEART | Status: FUNCTIONAL
Brand: CAPSUREFIX NOVUS MRI™ SURESCAN®

## 2023-03-15 DEVICE — LEAD 5076-45 MRI US RCMCRD
Type: IMPLANTABLE DEVICE | Site: HEART | Status: FUNCTIONAL
Brand: CAPSUREFIX NOVUS MRI™ SURESCAN®

## 2023-03-15 RX ORDER — HYDROCODONE BITARTRATE AND ACETAMINOPHEN 5; 325 MG/1; MG/1
1 TABLET ORAL EVERY 4 HOURS PRN
Status: DISCONTINUED | OUTPATIENT
Start: 2023-03-15 | End: 2023-03-15 | Stop reason: HOSPADM

## 2023-03-15 RX ORDER — SODIUM CHLORIDE 0.9 % (FLUSH) 0.9 %
10 SYRINGE (ML) INJECTION
Status: DISCONTINUED | OUTPATIENT
Start: 2023-03-15 | End: 2023-03-15 | Stop reason: HOSPADM

## 2023-03-15 RX ORDER — ACETAMINOPHEN 325 MG/1
650 TABLET ORAL EVERY 4 HOURS PRN
Status: DISCONTINUED | OUTPATIENT
Start: 2023-03-15 | End: 2023-03-15 | Stop reason: HOSPADM

## 2023-03-15 RX ORDER — MIDAZOLAM HYDROCHLORIDE 1 MG/ML
INJECTION, SOLUTION INTRAMUSCULAR; INTRAVENOUS
Status: DISCONTINUED | OUTPATIENT
Start: 2023-03-15 | End: 2023-03-15 | Stop reason: HOSPADM

## 2023-03-15 RX ORDER — CEFAZOLIN SODIUM 2 G/50ML
2 SOLUTION INTRAVENOUS
Status: DISCONTINUED | OUTPATIENT
Start: 2023-03-15 | End: 2023-03-15 | Stop reason: HOSPADM

## 2023-03-15 RX ORDER — FENTANYL CITRATE 50 UG/ML
INJECTION, SOLUTION INTRAMUSCULAR; INTRAVENOUS
Status: DISCONTINUED | OUTPATIENT
Start: 2023-03-15 | End: 2023-03-15 | Stop reason: HOSPADM

## 2023-03-15 RX ORDER — OXYCODONE AND ACETAMINOPHEN 7.5; 325 MG/1; MG/1
1 TABLET ORAL EVERY 4 HOURS PRN
Qty: 20 TABLET | Refills: 0 | Status: SHIPPED | OUTPATIENT
Start: 2023-03-15

## 2023-03-15 RX ORDER — CEFAZOLIN SODIUM 1 G/3ML
INJECTION, POWDER, FOR SOLUTION INTRAMUSCULAR; INTRAVENOUS
Status: DISCONTINUED | OUTPATIENT
Start: 2023-03-15 | End: 2023-03-15 | Stop reason: HOSPADM

## 2023-03-15 RX ORDER — CEPHALEXIN 500 MG/1
500 CAPSULE ORAL EVERY 8 HOURS
Qty: 12 CAPSULE | Refills: 0 | Status: SHIPPED | OUTPATIENT
Start: 2023-03-15 | End: 2023-03-19

## 2023-03-15 RX ORDER — LIDOCAINE HYDROCHLORIDE 20 MG/ML
INJECTION, SOLUTION EPIDURAL; INFILTRATION; INTRACAUDAL; PERINEURAL
Status: DISCONTINUED | OUTPATIENT
Start: 2023-03-15 | End: 2023-03-15 | Stop reason: HOSPADM

## 2023-03-15 NOTE — Clinical Note
The chest was prepped. The site was prepped with ChloraPrep. The site was clipped. The patient was draped. The patient was positioned supine. The patient was secured using safety straps and to an armboard.

## 2023-03-15 NOTE — DISCHARGE INSTRUCTIONS
PACEMAKER/ICD INSTRUCTIONS    SAFETY  BECAUSE OF THE AFTEREFFECTS OF THE SEDATION YOU RECEIVED, WE ADVISE YOU TO REFRAIN FROM THE FOLLOWING ACTIVITIES FOR 24 HOURS.   1. DO NOT DRIVE OR OPERATE MACHINERY FOR 24 HOURS   2. DO NOT OPERATE APPLIANCES IF ALONE.     3.DON'T SIGN LEGAL PAPERS FOR 24 HOURS.     4. WE ADVISE THAT SOMEONE STAY WITH YOU AFTER THE PROCEDURE FOR AT LEAST 8 HOURS    DISCOMFORT: FOR GENERAL DISCOMFORT AT THE PUNCTURE, YOU MAY TAKE TYLENOL, 1-2 TABS EVERY 4-6 HOURS AS NEEDED.  DO NOT TAKE MORE THAN 4000 MG  IN 24 HOUR PERIOD.    ACTIVITY/ WOUND INSTRUCTIONS     AFFECTED ARM  DO NOT LIFT ELBOW ABOVE SHOULDER HEIGHT FOR 6 WEEKS.                                  DO NOT  SWING ARM FOR 6 WEEKS                                DO NOT REMOVE DRESSING.  IT WILL BE REMOVED AT FOLLOW UP APPOINTMENT                                YOU MAY SHOWER IN 48 HOURS (Friday AFTER LUNCH.)                                  DO NOT REMOVE THE DRESSING FOR SHOWER.                                 USE HIBICLENS SOAP ON CHEST AND NECK AREA  FOR 1 WEEK.  FACE AWAY FROM SPRAY WHEN SHOWERING                                                                               REMOVE SLING FOR SHOWER, THEN REAPPLY AFTER SHOWER.                                USE ICE PACK FOR 48 HOURS .                                WEAR SLING AND SWATHE FOR 48 HOURS AROUND THE CLOCK THEN ONLY WHILE SLEEPING AT NIGHT FOR 6 WEEKS.                                 DO NOT DRIVE FOR 1 WEEK. IF PT USES LEFT ARM TO MAKE TURNS - DO NOT DRIVE FOR 4 WEEKS.                                DO NOT LIFT ANYTHING GREATER THAN 5 POUNDS UNTIL AFTER YOUR FOLLOW UP DEVICE CHECK APPOINTMENT.              6. CALL YOUR HEALTHCARE PROVIDER IF YOU START TO HAVE THE FOLLOWING SYMPTOMS:                    1. High fever (101 degrees or higher)                 2. Redness,drainage or swelling  or intense pain at the incision site       3.  Drowsiness that doesn't get better       4. Weakness or dizziness that doesn't get better            5. Repeated vomiting                                                                                                                 NOZIN INSTRUCTIONS     GOAL: TO REDUCE THE RISK OF POST-PROCEDURAL INFECTIONS BY BACTERIA IN THE NASAL CAVITY.  THINK OF IT AS A HAND  FOR YOUR NOSE.       HOW TO USE:  1. SHAKE NOZIN BOTTLE WELL                            2. TAKE A COTTON SWAB AND APPLY FOUR DROPS TO TIP.                            3. INSERT COTTON SWAB INTO ONE NOSTRIL, BEING SURE NOT TO GO DEEPER INTO NOSE THAN THE TIP OF THE SWAB.                            4.SWAB NOSTRIL 6 TIMES CLOCKWISE AND 6 TIMES COUNERCLOCKWISE.  MAKE SURE TO SWAB THE INSIDE FRONT POCKET OF NOSTRIL.                             5. TAKE SWAB OUT AND APPLY 2 DROPS TO THE SAME COTTON TIP.  REPEAT STEPS 3 AND 4 IN THE OTHER NOSTRIL      DO STEPS 1-5 TWICE A DAY FOR 7 DAYS.

## 2023-03-15 NOTE — BRIEF OP NOTE
Patient is s/p dual chamber PM. Jyotsna need the followin. Sling to left arm - wear for 48 hours, then only at night for 6 weeks.   2. Keflex 500 mg TID for 4 days at discharge   3. No lifting left elbow above shoulder height   4. No lifting over 5 pounds   5. No driving for 1 week and for 4 weeks if patient uses left arm to make turns   6. Patient may shower in 48 hours, do not let beam of shower hit site directly and no scrubbing in area    Chest Xray (ordered)   7. Follow up in device clinic in 1 week and with Dr. Mandeep Wadsworth in 3 months.

## 2023-03-15 NOTE — DISCHARGE SUMMARY
[facility] - Cath Lab (Hospital)  Discharge Summary      Admit Date: 3/15/2023    Discharge Date and Time:  03/15/2023 11:41 AM    Attending Physician: Mandeep Wadsworth MD     Reason for Admission: DC PM and ILR removal    Procedures Performed: Procedure(s) (LRB):  INSERTION, CARDIAC PACEMAKER, DUAL CHAMBER (Left)  REMOVAL, IMPLANTABLE LOOP RECORDER (N/A)    Hospital Course (synopsis of major diagnoses, care, treatment, and services provided during the course of the hospital stay): Successful DC PM implant and ILR removal     Goals of Care Treatment Preferences:  Code Status: Full Code      Consults: none    Significant Diagnostic Studies: Labs: BMP: No results for input(s): GLU, NA, K, CL, CO2, BUN, CREATININE, CALCIUM, MG in the last 48 hours., CMP No results for input(s): NA, K, CL, CO2, GLU, BUN, CREATININE, CALCIUM, PROT, ALBUMIN, BILITOT, ALKPHOS, AST, ALT, ANIONGAP, ESTGFRAFRICA, EGFRNONAA in the last 48 hours., and CBC No results for input(s): WBC, HGB, HCT, PLT in the last 48 hours.    Final Diagnoses:    Principal Problem: <principal problem not specified>   Secondary Diagnoses: There are no hospital problems to display for this patient.     Discharged Condition: good    Disposition: Home or Self Care    Follow Up/Patient Instructions:     Medications:  Reconciled Home Medications:      Medication List        START taking these medications      cephALEXin 500 MG capsule  Commonly known as: KEFLEX  Take 1 capsule (500 mg total) by mouth every 8 (eight) hours. for 4 days     oxyCODONE-acetaminophen 7.5-325 mg per tablet  Commonly known as: PERCOCET  Take 1 tablet by mouth every 4 (four) hours as needed for Pain.            CONTINUE taking these medications      alprostadiL 20 mcg injection  Commonly known as: EDEX  20 mcg by Intracavitary route daily as needed.     aspirin 81 MG EC tablet  Commonly known as: ECOTRIN  Take 81 mg by mouth.     atorvastatin 20 MG tablet  Commonly known as: LIPITOR  Take 20 mg by  mouth once daily.     cetirizine 10 MG tablet  Commonly known as: ZYRTEC  Take 1 tablet by mouth in the morning for 7 days     lisinopriL 10 MG tablet  Take 10 mg by mouth once daily.     sildenafiL 100 MG tablet  Commonly known as: VIAGRA  Take 100 mg by mouth daily as needed.            Discharge Procedure Orders   Diet general

## 2023-03-15 NOTE — INTERVAL H&P NOTE
The patient has been examined and the H&P has been reviewed:    I concur with the findings and no changes have occurred since H&P was written.    Anesthesia/Surgery risks, benefits and alternative options discussed and understood by patient/family.    DC PM for syncope and symptomatic bradycardia noted on ILR. Will remove ILR      There are no hospital problems to display for this patient.

## 2023-03-15 NOTE — PLAN OF CARE
Right hand and left FA saline lock discontinued with canula intact; tolerated well.  Left upper chest dressing remains c/d/i and wnl at time of discharge.  Sling and swath placed with daughter present with teach back received.   Pt able to eat and drink while in CVRU without issue; remains AAOx3 at time of discharge.   Discharger instructions, home medication list, and post discharge follow up appointments discussed with pt and daughter.   Discharged to daughter, via wheelchair, in no apparent distress. All personal belongings taken with pt. Encouraged continued compliance with MD directives.

## 2023-03-15 NOTE — Clinical Note
Patient Education   Your child has been referred to [facility]'s E.N.T Department.     Physician's: Zachary Mishra MD, Paula Izaguirre MD, Doyle Harvey PA-C    [facility]   [address] [place] [address]  [phone]    [facility]  [address] [place] [address].   [phone]  Take the elevator to the fourth floor.      Treatment for Broken Nose (Nasal Fracture) in Children  A nasal fracture is a break in 1 or more of the bones of the nose. It’s also called a broken nose. Nasal fractures are more common in adults than in children. Children’s nasal bones are more difficult to fracture. But the nasal bone is one of the most commonly fractured bones of the face. The lower part of the nasal bone is thinner than the upper part and breaks more easily. In babies, nasal fracture can cause trouble breathing. This is because babies can’t breathe through their mouths. A baby with nasal fracture needs emergency treatment.  Types of treatment  Your child may need to see an ear, nose, and throat doctor (otolaryngologist) for treatment. Treatment is based on your child’s age, overall health, and the type of injury.  Your child will need to sit upright for a time after the injury. This helps to reduce swelling of the nose. It also helps to keep blood from pooling in the nose. First treatments may include pain medicines and ice.  Any bones in the nose that are out of place will need to be lined up normally. This is called reduction. This is a common part of treatment for nasal fracture. Your child may need this right away or at a later time. A reduction may be done by moving the bones back into place (closed reduction). In some cases, surgery is done to move the bones (open reduction). Reduction is often with general anesthesia. This means your child sleeps through the procedure and doesn’t feel pain.  After reduction, the nose may need a splint. Your child’s nose may not look  The patient's elbows and knees were padded, heels floated, warming blanket given, and safety strap applied. exactly the way it did before. Rhinoplasty surgery (nose surgery) may help restore the nose to a better look.  If your child’s nasal fracture is more severe, he or she might need a more complex surgery right after the injury. This is called septorhinoplasty. It can help restore normal look of the nose. It also fixes a displaced nasal septum and blocked nasal airway.  Possible complications of a nasal fracture  Your health care team will work to prevent complications. Your child’s risk for possible complications may vary according to age and the extent of injury. Some possible complications include:  · Pocket of infection in the septum (septal abscess)  · Pocket of blood in the septum (septal hematoma)  · Severe nosebleed  · Infection of the brain or tissues around the brain  · Blocked tear duct  · Abnormal connection between the nasal cavity and the mouth  · Underdevelopment of the maxillary bone, making the middle of the face look sunken  · Change in appearance of the nose  Complications often need treatment, such as antibiotics or surgery.  Protecting your child’s nose during healing  After a nasal fracture, the nose needs time to heal. The nose is easy to injure again during this time. For this reason, most health care providers advise that children do not play any sports for at least 2 weeks. Your child should not play contact sports such as football or wrestling for at least 6 weeks.     When to call the health care provider  Call your child’s health care provider right away if your child has any of these:  · Fever of 100.4°F (38.0°C) or higher  · Bleeding that doesn’t stop  · Confusion  · Loss of consciousness   Date Last Reviewed: 7/21/2015 © 2000-2018 [employer]. [address] [place] [address], [place] [address]. All rights reserved. This information is not intended as a substitute for professional medical care. Always follow your healthcare professional's instructions.

## 2023-03-15 NOTE — PLAN OF CARE
POC rev'd w/ dtr and pt, BVU. Pt w/o complaints. VSS. Bilateral arm IVs started, PTW. Pt lives w/ brother temporarily but staying with dtr tonight.

## 2023-03-15 NOTE — Clinical Note
The lead was inserted under fluorscopic guidance. The lead was inserted in the right atrium. A new lead was attached to the right atrium.

## 2023-03-22 ENCOUNTER — HOSPITAL ENCOUNTER (OUTPATIENT)
Dept: CARDIOLOGY | Facility: HOSPITAL | Age: 78
Discharge: HOME | End: 2023-03-22
Attending: INTERNAL MEDICINE
Payer: MEDICARE

## 2023-03-22 DIAGNOSIS — Z95.818 OTHER SPECIFIED CARDIAC DEVICE IN SITU: ICD-10-CM

## 2023-03-22 DIAGNOSIS — I48.0 PAROXYSMAL ATRIAL FIBRILLATION: ICD-10-CM

## 2023-03-22 PROCEDURE — 93280 PM DEVICE PROGR EVAL DUAL: CPT | Mod: 26,,, | Performed by: INTERNAL MEDICINE

## 2023-03-22 PROCEDURE — 93280 PM DEVICE PROGR EVAL DUAL: CPT

## 2023-03-22 PROCEDURE — 93280 CARDIAC DEVICE CHECK - IN CLINIC & HOSPITAL: ICD-10-PCS | Mod: 26,,, | Performed by: INTERNAL MEDICINE

## 2023-03-27 VITALS
HEART RATE: 62 BPM | DIASTOLIC BLOOD PRESSURE: 62 MMHG | SYSTOLIC BLOOD PRESSURE: 116 MMHG | OXYGEN SATURATION: 97 % | WEIGHT: 159 LBS | RESPIRATION RATE: 20 BRPM | TEMPERATURE: 98 F | BODY MASS INDEX: 24.96 KG/M2 | HEIGHT: 67 IN

## 2023-03-28 ENCOUNTER — DOCUMENTATION ONLY (OUTPATIENT)
Dept: CARDIOLOGY | Facility: HOSPITAL | Age: 78
End: 2023-03-28
Payer: MEDICARE

## 2023-03-28 NOTE — PROGRESS NOTES
One week follow up phone call for wound check.  Hematoma was noted on initial check done 3/22/23.  Dr. Wadsworth reviewed interrogation and pocket - he instructed pt to apply warm compresses three times daily and hold ASA.   Hematoma appears to be resolving, pt reports it 'feels smaller' and reports less pain.  Pt will continue with warm compresses.  Will discuss with Dr. Wadsworth regarding resumption of ASA.

## 2023-03-30 ENCOUNTER — TELEPHONE (OUTPATIENT)
Dept: PULMONOLOGY | Facility: CLINIC | Age: 78
End: 2023-03-30
Payer: MEDICARE

## 2023-03-30 NOTE — TELEPHONE ENCOUNTER
Telephoned patient he states he wanted to thank me for all done for him. But he can not find the survey with my name and he wanted another patient satisfaction survey with my name on it to be sent to him. He said nobody made him feel better and wanted give credit on a survey. He has been very pleased with all the care he has received at [facility].   Happy to go on TV to let everyone know how much [facility] has helped him if someone would like him to.    Advised would route message to manager to determine how to send him another survey or if another way to provide statement of accolades.

## 2023-03-30 NOTE — TELEPHONE ENCOUNTER
----- Message from Bernadette Valdovinos MA sent at 3/30/2023 12:13 PM CDT -----  Contact: Patient  Called and spoke to pt he said he didn't say anything about a pacemaker pt stated he sees you for sleep apnea and on yall last conversation he did some things with cardiologist but you discussed some things with him and he said he needed to speak to you I offered pt to schedule a virtual pau he said he wanted to speak to you.    ----- Message -----  From: Yuly Carr  Sent: 3/30/2023  11:15 AM CDT  To: Puja ROBERTS Staff    Johnie Vinson would like a call back at [phone], in regards to questions he's having about his pacemaker.

## 2023-04-04 ENCOUNTER — TELEPHONE (OUTPATIENT)
Dept: CARDIOLOGY | Facility: HOSPITAL | Age: 78
End: 2023-04-04
Payer: MEDICARE

## 2023-04-04 NOTE — TELEPHONE ENCOUNTER
Pt called device clinic regarding his device pocket (pacer & leads implanted 3/15/23, Ananth).  Hematoma noted on initial evaluation 3/22/23 - pt was instructed apply warm compresses 3x daily and hold ASA per Dr. Wadsworth.    He states it 'is real soft' and is 'reduced' since last week.  Advised he should continue with warm compresses as previously instructed.

## 2023-04-17 ENCOUNTER — TELEPHONE (OUTPATIENT)
Dept: CARDIOLOGY | Facility: CLINIC | Age: 78
End: 2023-04-17
Payer: MEDICARE

## 2023-04-17 NOTE — TELEPHONE ENCOUNTER
Pt was on a car phone and unable to hear I will call him back.    Pt has procedure set up  for bunion for may 3rd. Needs a clearance to have this done. Do you want to clear him or do you want to see him. Please advise      It s with soy rosado md located in [place] [facility]. There has been no paper sent over and the pt just now calling thanks pb

## 2023-04-24 ENCOUNTER — TELEPHONE (OUTPATIENT)
Dept: CARDIOLOGY | Facility: CLINIC | Age: 78
End: 2023-04-24
Payer: MEDICARE

## 2023-04-24 ENCOUNTER — OFFICE VISIT (OUTPATIENT)
Dept: CARDIOLOGY | Facility: CLINIC | Age: 78
End: 2023-04-24
Payer: MEDICARE

## 2023-04-24 VITALS
BODY MASS INDEX: 24.12 KG/M2 | DIASTOLIC BLOOD PRESSURE: 62 MMHG | SYSTOLIC BLOOD PRESSURE: 140 MMHG | HEART RATE: 87 BPM | HEIGHT: 67 IN | OXYGEN SATURATION: 96 % | WEIGHT: 153.69 LBS

## 2023-04-24 DIAGNOSIS — Z01.810 PREOP CARDIOVASCULAR EXAM: Primary | ICD-10-CM

## 2023-04-24 DIAGNOSIS — G47.33 OSA (OBSTRUCTIVE SLEEP APNEA): ICD-10-CM

## 2023-04-24 DIAGNOSIS — Z95.0 CARDIAC PACEMAKER IN SITU: ICD-10-CM

## 2023-04-24 DIAGNOSIS — I10 HYPERTENSION, UNSPECIFIED TYPE: ICD-10-CM

## 2023-04-24 DIAGNOSIS — R55 SYNCOPE AND COLLAPSE: ICD-10-CM

## 2023-04-24 PROCEDURE — 1126F AMNT PAIN NOTED NONE PRSNT: CPT | Mod: CPTII,S$GLB,, | Performed by: STUDENT IN AN ORGANIZED HEALTH CARE EDUCATION/TRAINING PROGRAM

## 2023-04-24 PROCEDURE — 99999 PR PBB SHADOW E&M-EST. PATIENT-LVL III: ICD-10-PCS | Mod: PBBFAC,,, | Performed by: STUDENT IN AN ORGANIZED HEALTH CARE EDUCATION/TRAINING PROGRAM

## 2023-04-24 PROCEDURE — 99214 PR OFFICE/OUTPT VISIT, EST, LEVL IV, 30-39 MIN: ICD-10-PCS | Mod: S$GLB,,, | Performed by: STUDENT IN AN ORGANIZED HEALTH CARE EDUCATION/TRAINING PROGRAM

## 2023-04-24 PROCEDURE — 99214 OFFICE O/P EST MOD 30 MIN: CPT | Mod: S$GLB,,, | Performed by: STUDENT IN AN ORGANIZED HEALTH CARE EDUCATION/TRAINING PROGRAM

## 2023-04-24 PROCEDURE — 3078F DIAST BP <80 MM HG: CPT | Mod: CPTII,S$GLB,, | Performed by: STUDENT IN AN ORGANIZED HEALTH CARE EDUCATION/TRAINING PROGRAM

## 2023-04-24 PROCEDURE — 1126F PR PAIN SEVERITY QUANTIFIED, NO PAIN PRESENT: ICD-10-PCS | Mod: CPTII,S$GLB,, | Performed by: STUDENT IN AN ORGANIZED HEALTH CARE EDUCATION/TRAINING PROGRAM

## 2023-04-24 PROCEDURE — 3077F PR MOST RECENT SYSTOLIC BLOOD PRESSURE >= 140 MM HG: ICD-10-PCS | Mod: CPTII,S$GLB,, | Performed by: STUDENT IN AN ORGANIZED HEALTH CARE EDUCATION/TRAINING PROGRAM

## 2023-04-24 PROCEDURE — 1159F MED LIST DOCD IN RCRD: CPT | Mod: CPTII,S$GLB,, | Performed by: STUDENT IN AN ORGANIZED HEALTH CARE EDUCATION/TRAINING PROGRAM

## 2023-04-24 PROCEDURE — 3078F PR MOST RECENT DIASTOLIC BLOOD PRESSURE < 80 MM HG: ICD-10-PCS | Mod: CPTII,S$GLB,, | Performed by: STUDENT IN AN ORGANIZED HEALTH CARE EDUCATION/TRAINING PROGRAM

## 2023-04-24 PROCEDURE — 1159F PR MEDICATION LIST DOCUMENTED IN MEDICAL RECORD: ICD-10-PCS | Mod: CPTII,S$GLB,, | Performed by: STUDENT IN AN ORGANIZED HEALTH CARE EDUCATION/TRAINING PROGRAM

## 2023-04-24 PROCEDURE — 3077F SYST BP >= 140 MM HG: CPT | Mod: CPTII,S$GLB,, | Performed by: STUDENT IN AN ORGANIZED HEALTH CARE EDUCATION/TRAINING PROGRAM

## 2023-04-24 PROCEDURE — 99999 PR PBB SHADOW E&M-EST. PATIENT-LVL III: CPT | Mod: PBBFAC,,, | Performed by: STUDENT IN AN ORGANIZED HEALTH CARE EDUCATION/TRAINING PROGRAM

## 2023-04-24 NOTE — PROGRESS NOTES
Section of Cardiology                  Cardiac Clinic Note    Chief Complaint/Reason for consultation: preop risk stratification       HPI:   Johnie Vinson is a 78 y.o. male with h/o RED, PVD, hypertension, AVB s/p PPM who comes into Cardiology Clinic for evaluation.    4/24/2023  Will be having bunion removal procedure 5/3/2023 by Dr. Stoddard   Has had a surgery 10 /22 with ambulation of one of his toes, no complications   He does not want to do general anesthesia  Active at home, but foot hurts  Can walk up a flight of stairs without issues  Had back surgery 2 years ago   No syncope since PPM    Denies chest pain, SOB, PND    Denies CVA, DM  Stopped smoking >5-6 years    EKG 3/15/23 AV dual paced rhythm     Stress Echo 11/16: No ischemia  NM Stress 2017: No ischemia , nl EF  Echo 5/22: Normal EF 55-60%       [facility] No results found for this or any previous visit.            ROS: All 10 systems reviewed. Please refer to the HPI for pertinent positives. All other systems negative.     Past Medical History  Past Medical History:   Diagnosis Date    Arrhythmia     Hypertension     RED (obstructive sleep apnea)     PAD (peripheral artery disease)     Syncope and collapse        Surgical History  Past Surgical History:   Procedure Laterality Date    A-V CARDIAC PACEMAKER INSERTION Left 3/15/2023    Procedure: INSERTION, CARDIAC PACEMAKER, DUAL CHAMBER;  Surgeon: Mandeep Wadsworth MD;  Location: [facility] CATH LAB;  Service: Cardiology;  Laterality: Left;  MDT device/Patient to arrive at 8am    ppm icd N/A     REMOVAL OF IMPLANTABLE LOOP RECORDER N/A 3/15/2023    Procedure: REMOVAL, IMPLANTABLE LOOP RECORDER;  Surgeon: Mandeep Wadsworth MD;  Location: [facility] CATH LAB;  Service: Cardiology;  Laterality: N/A;  MRI safe  Medtronic loop recorder  M: [id number]  SN: [id number]  Implanted 5/2022 by LAUREEN Douglas for syncope.          Allergies:   Review of patient's allergies indicates:  No Known Allergies    Social  "History:  Social History     Socioeconomic History    Marital status: Single   Tobacco Use    Smoking status: Former     Types: Cigarettes    Smokeless tobacco: Never   Substance and Sexual Activity    Alcohol use: Never    Drug use: Never    Sexual activity: Never       Family History:  family history is not on file.    Home Medications:  Current Outpatient Medications on File Prior to Visit   Medication Sig Dispense Refill    alprostadiL (EDEX) 20 mcg injection 20 mcg by Intracavitary route daily as needed.      aspirin (ECOTRIN) 81 MG EC tablet Take 81 mg by mouth.      atorvastatin (LIPITOR) 20 MG tablet Take 20 mg by mouth once daily.      cetirizine (ZYRTEC) 10 MG tablet Take 1 tablet by mouth in the morning for 7 days      lisinopriL 10 MG tablet Take 10 mg by mouth once daily.      oxyCODONE-acetaminophen (PERCOCET) 7.5-325 mg per tablet Take 1 tablet by mouth every 4 (four) hours as needed for Pain. 20 tablet 0    sildenafiL (VIAGRA) 100 MG tablet Take 100 mg by mouth daily as needed.       No current facility-administered medications on file prior to visit.       Physical exam:  BP (!) 140/62 (BP Location: Right arm, Patient Position: Sitting, BP Method: Medium (Manual))   Pulse 87   Ht 5' 6.5" (1.689 m)   Wt 69.7 kg (153 lb 10.6 oz)   SpO2 96%   BMI 24.43 kg/m²         General: Pt is a 78 y.o. year old male who is AAOx3, in NAD, is pleasant, well nourished, looks stated age  HEENT: PERRL, EOMI, Oral mucosa pink & moist  CVS  No abnormal cardiac pulsations noted on inspection. JVP not raised. The apical impulse is normal on palpation, and is located in the left 5th intercostal space in the mid - clavicular line. No palpable thrills or abnormal pulsations noted. RR, S1 - S2 heard, no murmurs, rubs or gallops appreciated.   PUL : CTA B/L. No wheezes/crackles heard   ABD : BS +, soft. No tenderness elicited   LE : No C/C/E. Distal Pulses palpable B/L         LABS:    Chemistry:   Lab Results   Component " Value Date     03/03/2023    K 4.0 03/03/2023     03/03/2023    CO2 28 03/03/2023    BUN 22 03/03/2023    CREATININE 1.0 03/03/2023    CALCIUM 9.0 03/03/2023     Cardiac Markers: No results found for: CKTOTAL, CKMB, CKMBINDEX, TROPONINI  Cardiac Markers (Last 3): No results found for: CKTOTAL, CKMB, CKMBINDEX, TROPONINI  CBC:   Lab Results   Component Value Date    WBC 4.70 03/03/2023    HGB 13.6 (L) 03/03/2023    HCT 42.7 03/03/2023     (H) 03/03/2023     03/03/2023     Lipids: No results found for: CHOL, TRIG, HDL, LDLDIRECT  Coagulation:   Lab Results   Component Value Date    INR 1.1 03/03/2023    APTT 26.3 03/03/2023           Assessment        1. Preop cardiovascular exam    2. Syncope and collapse    3. RED (obstructive sleep apnea)    4. Cardiac pacemaker in situ    5. Hypertension, unspecified type         Plan:    Preop risk stratification   >4 METS  Moderate CV risk for surgery  No further cardiac workup needed at this time     Hypertension  Elevated, normotensive on prior visits  Continue lisinopril    AV block s/p pacemaker  Follows up with Dr. Wadsworth  Keep appointment with the device clinic    RED   Continue CPAP    Low salt, low fat diet  Exercise as tolerated, at least 30 min daily         This note was prepared using voice recognition system and is likely to have sound alike errors that may have been overlooked even after proofreading.     I have reviewed all pertinent chart information.  Plans and recommendations have been formulated under my direct supervision. All questions answered and patient voiced understanding.   If symptoms persist go to the ED.    F/u with 2 months         Wenceslao Bean MD  Cardiology

## 2023-04-25 ENCOUNTER — TELEPHONE (OUTPATIENT)
Dept: CARDIOLOGY | Facility: CLINIC | Age: 78
End: 2023-04-25
Payer: MEDICARE

## 2023-04-25 NOTE — TELEPHONE ENCOUNTER
Refaxed last note with clearance      Need clearance for his foot Sx  NP asked about regional block - pt said needs to be put to sleep - regional block doesn't work - pt wanted to let us know that going to sleep hasn't been an issue     May 3rd is foot Sx - Dr Harry Stoddard    Please advise

## 2023-06-20 ENCOUNTER — CLINICAL SUPPORT (OUTPATIENT)
Dept: CARDIOLOGY | Facility: HOSPITAL | Age: 78
End: 2023-06-20
Payer: MEDICARE

## 2023-06-20 DIAGNOSIS — Z95.0 PRESENCE OF CARDIAC PACEMAKER: ICD-10-CM

## 2023-06-20 PROCEDURE — 93294 REM INTERROG EVL PM/LDLS PM: CPT | Mod: S$GLB,,, | Performed by: INTERNAL MEDICINE

## 2023-06-20 PROCEDURE — 93294 CARDIAC DEVICE CHECK - REMOTE: ICD-10-PCS | Mod: S$GLB,,, | Performed by: INTERNAL MEDICINE

## 2023-06-20 PROCEDURE — 93296 REM INTERROG EVL PM/IDS: CPT | Performed by: INTERNAL MEDICINE

## 2023-07-12 ENCOUNTER — OFFICE VISIT (OUTPATIENT)
Dept: CARDIOLOGY | Facility: CLINIC | Age: 78
End: 2023-07-12
Attending: INTERNAL MEDICINE
Payer: MEDICARE

## 2023-07-12 ENCOUNTER — HOSPITAL ENCOUNTER (OUTPATIENT)
Dept: CARDIOLOGY | Facility: HOSPITAL | Age: 78
Discharge: HOME | End: 2023-07-12
Attending: INTERNAL MEDICINE
Payer: MEDICARE

## 2023-07-12 VITALS
HEART RATE: 97 BPM | OXYGEN SATURATION: 97 % | WEIGHT: 151.69 LBS | DIASTOLIC BLOOD PRESSURE: 62 MMHG | SYSTOLIC BLOOD PRESSURE: 108 MMHG | HEIGHT: 67 IN | BODY MASS INDEX: 23.81 KG/M2

## 2023-07-12 DIAGNOSIS — Z95.0 CARDIAC PACEMAKER IN SITU: Primary | ICD-10-CM

## 2023-07-12 DIAGNOSIS — Z95.818 OTHER SPECIFIED CARDIAC DEVICE IN SITU: ICD-10-CM

## 2023-07-12 DIAGNOSIS — I48.0 PAROXYSMAL ATRIAL FIBRILLATION: ICD-10-CM

## 2023-07-12 DIAGNOSIS — R55 SYNCOPE AND COLLAPSE: ICD-10-CM

## 2023-07-12 DIAGNOSIS — I49.9 CARDIAC ARRHYTHMIA, UNSPECIFIED CARDIAC ARRHYTHMIA TYPE: ICD-10-CM

## 2023-07-12 PROCEDURE — 1126F AMNT PAIN NOTED NONE PRSNT: CPT | Mod: CPTII,S$GLB,, | Performed by: INTERNAL MEDICINE

## 2023-07-12 PROCEDURE — 1160F RVW MEDS BY RX/DR IN RCRD: CPT | Mod: CPTII,S$GLB,, | Performed by: INTERNAL MEDICINE

## 2023-07-12 PROCEDURE — 99214 PR OFFICE/OUTPT VISIT, EST, LEVL IV, 30-39 MIN: ICD-10-PCS | Mod: S$GLB,,, | Performed by: INTERNAL MEDICINE

## 2023-07-12 PROCEDURE — 3074F SYST BP LT 130 MM HG: CPT | Mod: CPTII,S$GLB,, | Performed by: INTERNAL MEDICINE

## 2023-07-12 PROCEDURE — 3288F FALL RISK ASSESSMENT DOCD: CPT | Mod: CPTII,S$GLB,, | Performed by: INTERNAL MEDICINE

## 2023-07-12 PROCEDURE — 1160F PR REVIEW ALL MEDS BY PRESCRIBER/CLIN PHARMACIST DOCUMENTED: ICD-10-PCS | Mod: CPTII,S$GLB,, | Performed by: INTERNAL MEDICINE

## 2023-07-12 PROCEDURE — 93280 PM DEVICE PROGR EVAL DUAL: CPT

## 2023-07-12 PROCEDURE — 3288F PR FALLS RISK ASSESSMENT DOCUMENTED: ICD-10-PCS | Mod: CPTII,S$GLB,, | Performed by: INTERNAL MEDICINE

## 2023-07-12 PROCEDURE — 99999 PR PBB SHADOW E&M-EST. PATIENT-LVL III: CPT | Mod: PBBFAC,,, | Performed by: INTERNAL MEDICINE

## 2023-07-12 PROCEDURE — 93280 CARDIAC DEVICE CHECK - IN CLINIC & HOSPITAL: ICD-10-PCS | Mod: 26,,, | Performed by: INTERNAL MEDICINE

## 2023-07-12 PROCEDURE — 3074F PR MOST RECENT SYSTOLIC BLOOD PRESSURE < 130 MM HG: ICD-10-PCS | Mod: CPTII,S$GLB,, | Performed by: INTERNAL MEDICINE

## 2023-07-12 PROCEDURE — 1159F MED LIST DOCD IN RCRD: CPT | Mod: CPTII,S$GLB,, | Performed by: INTERNAL MEDICINE

## 2023-07-12 PROCEDURE — 99214 OFFICE O/P EST MOD 30 MIN: CPT | Mod: S$GLB,,, | Performed by: INTERNAL MEDICINE

## 2023-07-12 PROCEDURE — 1101F PR PT FALLS ASSESS DOC 0-1 FALLS W/OUT INJ PAST YR: ICD-10-PCS | Mod: CPTII,S$GLB,, | Performed by: INTERNAL MEDICINE

## 2023-07-12 PROCEDURE — 1159F PR MEDICATION LIST DOCUMENTED IN MEDICAL RECORD: ICD-10-PCS | Mod: CPTII,S$GLB,, | Performed by: INTERNAL MEDICINE

## 2023-07-12 PROCEDURE — 1126F PR PAIN SEVERITY QUANTIFIED, NO PAIN PRESENT: ICD-10-PCS | Mod: CPTII,S$GLB,, | Performed by: INTERNAL MEDICINE

## 2023-07-12 PROCEDURE — 99999 PR PBB SHADOW E&M-EST. PATIENT-LVL III: ICD-10-PCS | Mod: PBBFAC,,, | Performed by: INTERNAL MEDICINE

## 2023-07-12 PROCEDURE — 3078F DIAST BP <80 MM HG: CPT | Mod: CPTII,S$GLB,, | Performed by: INTERNAL MEDICINE

## 2023-07-12 PROCEDURE — 93280 PM DEVICE PROGR EVAL DUAL: CPT | Mod: 26,,, | Performed by: INTERNAL MEDICINE

## 2023-07-12 PROCEDURE — 3078F PR MOST RECENT DIASTOLIC BLOOD PRESSURE < 80 MM HG: ICD-10-PCS | Mod: CPTII,S$GLB,, | Performed by: INTERNAL MEDICINE

## 2023-07-12 PROCEDURE — 1101F PT FALLS ASSESS-DOCD LE1/YR: CPT | Mod: CPTII,S$GLB,, | Performed by: INTERNAL MEDICINE

## 2023-07-12 NOTE — PROGRESS NOTES
Subjective:   Patient ID:  Johnie Vinson is a 78 y.o. male who presents for follow-up of Pacemaker Check      78 yoM HTN, PVD here for second opinion regarding need for PM.     11/22: He had near syncope leading to ILR implantation by Dr Tony Douglas with [facility]. He had nocturnal Mobitz I second degree AV block noted. There was discussion about PM implantation on his note 9/29/22. Given that there was nocturnal only AV block, no PM was planned. He was recently diagnosed with RED, CPAP not started. He has not had a syncopal event since ILR implant 5/22. Normal EF.     3/23: Bradycardia and transient AVB, mostly during nocturnal hours. Some of the times recorded, he was likely awake. He had high frequency of events. He was started on CPAP with recurrent, but less frequent transient AV block episodes.     Interval history: DC PM 3/15/23. 60% AP, 48% , no arrhythmias, no further syncope.     Stress Echo 11/16: No ischemia  NM Stress 2017: No ischemia , nl EF  Echo 5/22: Normal EF 55-60%  ILR implanted 5/19/22    Past Medical History:  No date: Arrhythmia  No date: Hypertension  No date: RED (obstructive sleep apnea)  No date: PAD (peripheral artery disease)  No date: Syncope and collapse    Past Surgical History:  3/15/2023: A-V CARDIAC PACEMAKER INSERTION; Left      Comment:  Procedure: INSERTION, CARDIAC PACEMAKER, DUAL CHAMBER;                 Surgeon: Mandeep Wadsworth MD;  Location: [facility] CATH                LAB;  Service: Cardiology;  Laterality: Left;  MDT                device/Patient to arrive at 8am  No date: ppm icd; N/A  3/15/2023: REMOVAL OF IMPLANTABLE LOOP RECORDER; N/A      Comment:  Procedure: REMOVAL, IMPLANTABLE LOOP RECORDER;  Surgeon:               Mandeep Wadsworth MD;  Location: [facility] CATH LAB;                 Service: Cardiology;  Laterality: N/A;  MRI                safeMedtronic loop recorderM: [id number]:                [id number] 5/2022 by LAUREEN Douglas for  syncope.    Social History    Socioeconomic History      Marital status: Single    Tobacco Use      Smoking status: Former        Types: Cigarettes      Smokeless tobacco: Never    Substance and Sexual Activity      Alcohol use: Never      Drug use: Never      Sexual activity: Never      No family history on file.      Current Outpatient Medications:  alprostadiL (EDEX) 20 mcg injection, 20 mcg by Intracavitary route daily as needed., Disp: , Rfl:   aspirin (ECOTRIN) 81 MG EC tablet, Take 81 mg by mouth., Disp: , Rfl:   atorvastatin (LIPITOR) 20 MG tablet, Take 20 mg by mouth once daily., Disp: , Rfl:   cetirizine (ZYRTEC) 10 MG tablet, Take 1 tablet by mouth in the morning for 7 days, Disp: , Rfl:   lisinopriL 10 MG tablet, Take 10 mg by mouth once daily., Disp: , Rfl:   oxyCODONE-acetaminophen (PERCOCET) 7.5-325 mg per tablet, Take 1 tablet by mouth every 4 (four) hours as needed for Pain., Disp: 20 tablet, Rfl: 0  sildenafiL (VIAGRA) 100 MG tablet, Take 100 mg by mouth daily as needed., Disp: , Rfl:     No current facility-administered medications for this visit.            Review of Systems   Constitutional: Negative.   HENT: Negative.     Eyes: Negative.    Cardiovascular:  Positive for syncope. Negative for chest pain, dyspnea on exertion, leg swelling, near-syncope and palpitations.   Respiratory: Negative.  Negative for shortness of breath.    Endocrine: Negative.    Hematologic/Lymphatic: Negative.    Skin: Negative.    Musculoskeletal: Negative.    Gastrointestinal: Negative.    Genitourinary: Negative.    Neurological:  Negative for dizziness and light-headedness.   Psychiatric/Behavioral: Negative.     Allergic/Immunologic: Negative.      Objective:   Physical Exam  Vitals reviewed.   Constitutional:       General: He is not in acute distress.     Appearance: He is well-developed.   HENT:      Head: Normocephalic and atraumatic.   Eyes:      Pupils: Pupils are equal, round, and reactive to light.   Neck:       Thyroid: No thyromegaly.      Vascular: No JVD.   Cardiovascular:      Rate and Rhythm: Normal rate and regular rhythm.      Chest Wall: PMI is not displaced.      Heart sounds: Normal heart sounds, S1 normal and S2 normal. No murmur heard.    No friction rub. No gallop.   Pulmonary:      Effort: Pulmonary effort is normal. No respiratory distress.      Breath sounds: Normal breath sounds. No wheezing or rales.   Abdominal:      General: Bowel sounds are normal. There is no distension.      Palpations: Abdomen is soft.      Tenderness: There is no abdominal tenderness. There is no guarding or rebound.   Musculoskeletal:         General: No tenderness. Normal range of motion.      Cervical back: Normal range of motion.   Skin:     General: Skin is warm and dry.      Findings: No erythema or rash.   Neurological:      Mental Status: He is alert and oriented to person, place, and time.      Cranial Nerves: No cranial nerve deficit.   Psychiatric:         Behavior: Behavior normal.         Thought Content: Thought content normal.         Judgment: Judgment normal.       Assessment:      1. Cardiac pacemaker in situ    2. Cardiac arrhythmia, unspecified cardiac arrhythmia type    3. Syncope and collapse        Plan:     78 yoM here for PM follow up. Normal DC PM function with no sustained arrhythmias. I discussed routine device follow up including quarterly to bi-annual device checks for device function as well as yearly follow up in the EP clinic. The patient  was advised to call with any concerns regarding their device. Device clinic follow up as scheduled. RTC 1y

## 2023-09-18 ENCOUNTER — CLINICAL SUPPORT (OUTPATIENT)
Dept: CARDIOLOGY | Facility: HOSPITAL | Age: 78
End: 2023-09-18
Payer: MEDICARE

## 2023-09-18 DIAGNOSIS — Z95.0 PRESENCE OF CARDIAC PACEMAKER: ICD-10-CM

## 2023-09-18 PROCEDURE — 93296 REM INTERROG EVL PM/IDS: CPT | Performed by: INTERNAL MEDICINE

## 2023-11-12 ENCOUNTER — CLINICAL SUPPORT (OUTPATIENT)
Dept: CARDIOLOGY | Facility: HOSPITAL | Age: 78
End: 2023-11-12
Payer: MEDICARE

## 2023-11-12 ENCOUNTER — CLINICAL SUPPORT (OUTPATIENT)
Dept: CARDIOLOGY | Facility: HOSPITAL | Age: 78
End: 2023-11-12
Attending: INTERNAL MEDICINE
Payer: MEDICARE

## 2023-11-12 DIAGNOSIS — Z95.0 PRESENCE OF CARDIAC PACEMAKER: ICD-10-CM

## 2023-11-12 PROCEDURE — 93296 REM INTERROG EVL PM/IDS: CPT | Performed by: INTERNAL MEDICINE

## 2023-11-12 PROCEDURE — 93294 REM INTERROG EVL PM/LDLS PM: CPT | Mod: S$GLB,,, | Performed by: INTERNAL MEDICINE

## 2023-11-12 PROCEDURE — 93294 CARDIAC DEVICE CHECK CHECK - REMOTE ALERT: ICD-10-PCS | Mod: S$GLB,,, | Performed by: INTERNAL MEDICINE

## 2023-11-13 ENCOUNTER — TELEPHONE (OUTPATIENT)
Dept: CARDIOLOGY | Facility: HOSPITAL | Age: 78
End: 2023-11-13
Payer: MEDICARE

## 2023-11-16 NOTE — TELEPHONE ENCOUNTER
Follow up phone call regarding remote alert for increased V pacing percentage.  No answer, left message requesting patient return call to device clinic.  Need to review current medications and see if pt has any symptoms.

## 2023-11-24 LAB
OHS CV AF BURDEN PERCENT: < 1
OHS CV DC REMOTE DEVICE TYPE: NORMAL
OHS CV ICD SHOCK: NO
OHS CV RV PACING PERCENT: 74.97 %

## 2023-12-17 ENCOUNTER — CLINICAL SUPPORT (OUTPATIENT)
Dept: CARDIOLOGY | Facility: HOSPITAL | Age: 78
End: 2023-12-17
Payer: MEDICARE

## 2023-12-17 DIAGNOSIS — Z95.0 PRESENCE OF CARDIAC PACEMAKER: ICD-10-CM

## 2023-12-17 PROCEDURE — 93296 REM INTERROG EVL PM/IDS: CPT | Performed by: INTERNAL MEDICINE

## 2024-02-10 ENCOUNTER — CLINICAL SUPPORT (OUTPATIENT)
Dept: CARDIOLOGY | Facility: HOSPITAL | Age: 79
End: 2024-02-10
Payer: MEDICARE

## 2024-02-10 DIAGNOSIS — Z95.0 PRESENCE OF CARDIAC PACEMAKER: ICD-10-CM

## 2024-02-11 ENCOUNTER — CLINICAL SUPPORT (OUTPATIENT)
Dept: CARDIOLOGY | Facility: HOSPITAL | Age: 79
End: 2024-02-11
Attending: INTERNAL MEDICINE
Payer: MEDICARE

## 2024-02-11 DIAGNOSIS — Z95.0 PRESENCE OF CARDIAC PACEMAKER: ICD-10-CM

## 2024-02-11 PROCEDURE — 93296 REM INTERROG EVL PM/IDS: CPT | Performed by: INTERNAL MEDICINE

## 2024-02-11 PROCEDURE — 93294 REM INTERROG EVL PM/LDLS PM: CPT | Mod: S$GLB,,, | Performed by: INTERNAL MEDICINE

## 2024-02-23 LAB
OHS CV AF BURDEN PERCENT: < 1
OHS CV DC REMOTE DEVICE TYPE: NORMAL
OHS CV RV PACING PERCENT: 70.96 %

## 2024-05-10 ENCOUNTER — CLINICAL SUPPORT (OUTPATIENT)
Dept: CARDIOLOGY | Facility: HOSPITAL | Age: 79
End: 2024-05-10
Payer: MEDICARE

## 2024-05-10 DIAGNOSIS — Z95.0 PRESENCE OF CARDIAC PACEMAKER: ICD-10-CM

## 2024-05-12 ENCOUNTER — CLINICAL SUPPORT (OUTPATIENT)
Dept: CARDIOLOGY | Facility: HOSPITAL | Age: 79
End: 2024-05-12
Attending: INTERNAL MEDICINE
Payer: MEDICARE

## 2024-05-12 DIAGNOSIS — Z95.0 PRESENCE OF CARDIAC PACEMAKER: ICD-10-CM

## 2024-05-12 PROCEDURE — 93294 REM INTERROG EVL PM/LDLS PM: CPT | Mod: S$GLB,,, | Performed by: INTERNAL MEDICINE

## 2024-05-12 PROCEDURE — 93296 REM INTERROG EVL PM/IDS: CPT | Performed by: INTERNAL MEDICINE

## 2024-05-30 LAB
OHS CV AF BURDEN PERCENT: < 1
OHS CV DC REMOTE DEVICE TYPE: NORMAL
OHS CV ICD SHOCK: NO
OHS CV RV PACING PERCENT: 77.97 %

## 2024-06-07 ENCOUNTER — TELEPHONE (OUTPATIENT)
Dept: PULMONOLOGY | Facility: CLINIC | Age: 79
End: 2024-06-07
Payer: MEDICARE

## 2024-06-07 NOTE — TELEPHONE ENCOUNTER
Returned patients call he wanted to get scheduled to see Puja regarding his CPAP.----- Message from Emy De La Cruz sent at 6/7/2024 12:20 PM CDT -----  Contact: dyana Jett is calling regarding equipment and questions.  He would like to speak directly to her he states.   Please give him a call back at [phone]

## 2024-06-12 NOTE — PROGRESS NOTES
Outpatient Pulmonary Clinic Visit     Subjective:      Patient ID: Johnie Vinson is a 79 y.o. male.    Patient Active Problem List   Diagnosis    Blackout spell    Cardiac arrhythmia    PAD (peripheral artery disease)    Syncope and collapse    RED on CPAP    Cardiac pacemaker in situ    Hypertension       he has been referred by No ref. provider found for evaluation and management for   Chief Complaint   Patient presents with    Sleep Apnea       Chief Complaint: Sleep Apnea      HPI: Johnie Vinson is here for follow up for RED and CPAP complaince assessment, needs updated order to obtain supplies.    He is on Auto CPAP of 5-20 cmH2O pressure which is optimally controlling sleep apnea with apneic index (AHI) 2.4 events an hour.   He is compliant with CPAP use. Complaince download today reveals 73% of days with greater than 4 hours of device use.   Patient reports benefit from CPAP use and denies snoring and excessive daytime sleepiness.  Patient reports no complaints. Nasal pillows mask is used.     [facility]  Res Med WITH Modem  Set Up Date: 2/9/23 01/11/2023, 01/12/2023 Home Sleep Study 2 nights moderate obstructive sleep apnea AHI: 21 RDI 29 <90% SpO2 0.5% Mean SpO2 95.9%    He had initial evaluation with [facility] 8/06/2022 Home Sleep Study mild obstructive sleep apnea AHI 11.3.  He never began CPAP.   Home Sleep Study with [facility] ordered by Maria Fernanda Gomez NP     Compliance Report  Compliance  Payor Standard  Usage 05/11/2024 - 06/09/2024  Usage days 28/30 days (93%)  >= 4 hours 22 days (73%)  < 4 hours 6 days (20%)  Usage hours 156 hours 29 minutes  Average usage (total days) 5 hours 13 minutes  Average usage (days used) 5 hours 35 minutes  Median usage (days used) 5 hours 52 minutes  Total used hours (value since last reset - 06/09/2024) 2,423 hours  AirSense 11 AutoSet  Serial number [id number]  Mode AutoSet  Min Pressure 5 cmH2O  Max Pressure 20  cmH2O  EPR Fulltime  EPR level 3  Response Standard  Therapy  Pressure - cmH2O Median: 7.4 95th percentile: 10.0 Maximum: 11.1  Leaks - L/min Median: 15.3 95th percentile: 32.0 Maximum: 54.6  Events per hour AI: 2.1 HI: 0.3 AHI: 2.4  Apnea Index Central: 0.5 Obstructive: 1.4 Unknown: 0.2  RERA Index 0.3  Cheyne-Giron respiration (average duration per night) 0 minutes (0%)    [place] Sleepiness Scale       6/13/2024     8:11 AM 2/3/2023    12:50 PM   EPWORTH SLEEPINESS SCALE   Sitting and reading 0 0   Watching TV 0 0   Sitting, inactive in a public place (e.g. 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     Previous Report Reviewed: lab reports, office notes, and radiology reports     Past Medical History: The following portions of the patient's history were reviewed and updated as appropriate:   He  has a past surgical history that includes ppm icd (N/A); A-V cardiac pacemaker insertion (Left, 3/15/2023); and Removal of implantable loop recorder (N/A, 3/15/2023).  His family history is not on file.  He  reports that he has quit smoking. His smoking use included cigarettes. He has never used smokeless tobacco. He reports that he does not drink alcohol and does not use drugs.  He has a current medication list which includes the following prescription(s): alprostadil, aspirin, atorvastatin, cetirizine, lisinopril, oxycodone-acetaminophen, and sildenafil.  He is allergic to sinus allergy..    Review of Systems   Constitutional:  Negative for fever, chills, weight  "loss, weight gain, activity change, appetite change, fatigue and night sweats.   HENT:  Negative for postnasal drip, rhinorrhea, sinus pressure, voice change and congestion.    Eyes:  Negative for redness and itching.   Respiratory:  Negative for cough, sputum production, chest tightness, shortness of breath, wheezing, orthopnea, asthma nighttime symptoms, dyspnea on extertion, use of rescue inhaler and somnolence.    Cardiovascular: Negative.  Negative for chest pain, palpitations and leg swelling.   Genitourinary:  Negative for difficulty urinating and hematuria.   Endocrine:  Negative for cold intolerance and heat intolerance.    Musculoskeletal:  Negative for arthralgias, gait problem, joint swelling and myalgias.   Skin: Negative.    Gastrointestinal:  Negative for nausea, vomiting, abdominal pain and acid reflux.   Neurological:  Negative for dizziness, weakness, light-headedness and headaches.   Hematological:  Negative for adenopathy. No excessive bruising.   All other systems reviewed and are negative.     Objective:   /72   Pulse 82   Resp 20   Ht 5' 6" (1.676 m)   Wt 71.2 kg (156 lb 15.5 oz)   SpO2 96%   BMI 25.34 kg/m²   Physical Exam  Vitals and nursing note reviewed.   Constitutional:       Appearance: He is well-developed.   HENT:      Head: Normocephalic and atraumatic.   Eyes:      Conjunctiva/sclera: Conjunctivae normal.   Cardiovascular:      Rate and Rhythm: Normal rate and regular rhythm.      Heart sounds: Normal heart sounds.   Pulmonary:      Effort: Pulmonary effort is normal.      Breath sounds: Normal breath sounds.   Abdominal:      General: Bowel sounds are normal.      Palpations: Abdomen is soft.   Musculoskeletal:         General: Normal range of motion.      Cervical back: Normal range of motion and neck supple.   Skin:     General: Skin is warm and dry.   Neurological:      Mental Status: He is alert and oriented to person, place, and time.      Deep Tendon Reflexes: " Reflexes are normal and symmetric.   Psychiatric:         Behavior: Behavior normal.         Thought Content: Thought content normal.         Judgment: Judgment normal.         Personal Diagnostic Review  Review of labs, xray's, cardiology reports.     Assessment:     1. RED on CPAP    2. Hypertension, unspecified type    3. Cardiac arrhythmia, unspecified cardiac arrhythmia type        Orders Placed This Encounter   Procedures    CPAP/BIPAP SUPPLIES     Benefits and compliant  90 day supply. 4 refills  HME: [facility]     Order Specific Question:   Length of need (1-99 months):     Answer:   99     Order Specific Question:   Choose ONE mask type and its corresponding cushions and/or pillows:     Answer:    Nasal Mask, 1 per 90 days:  Nasal Cushions, (6 per 90 days):  Nasal Pillows, (6 per 90 days)     Order Specific Question:   Choose EITHER Heated or Non-Heated Tubjing     Answer:    Non-Heated Tubing, 1 per 90 days     Order Specific Question:   Number of Days Needed:     Answer:   99     Order Specific Question:   All other supplies as needed as listed below:     Answer:    Headgear, 1 per 180 days     Order Specific Question:   All other supplies as needed as listed below:     Answer:    Chin Strap, 1 per 180 days     Order Specific Question:   All other supplies as needed as listed below:     Answer:    Disposable Filter, 6 per 90 days     Order Specific Question:   All other supplies as needed as listed below:     Answer:    Humidifier Chamber, 1 per 180 days     Order Specific Question:   All other supplies as needed as listed below:     Answer:    Non-Disposable Filter, 1 per 180 days     Plan:   Discussed diagnosis, its evaluation, treatment and usual course. All questions answered.  Problem List Items Addressed This Visit       RED on CPAP - Primary     01/11/2023, 01/12/2023 Home Sleep Study 2 nights moderate obstructive sleep apnea AHI: 21 RDI 29 <90% SpO2 0.5%  Mean SpO2 95.9%  2/9/2023 Set Up Date Res Med WITH [facility]  On Auto CPAP of 5-20 cmH2O pressure which is optimally controlling sleep apnea with apneic index (AHI) 2.4 events an hour.   Pleased with settings, continue Auto CPAP 5-20 cm   Nasal pillows mask   [facility]            Relevant Orders    CPAP/BIPAP SUPPLIES    Hypertension     Stable continued management with Primary Care Provider           Cardiac arrhythmia     Continued management with cardiology   Continued adherence to CPAP nightly   1/17/2023 cardiac device check  Arrhythmic events (AE)   Nocturnal Sinus Bradycardia   Sinus Bradycardia   Intermittent Third Degree Heart block   Second Degree AV Block, Type I   Bradycardia event(s) recorded   Pause event(s) recorded   Device-identified arrhythmic events without corresponding EGMs and/or details noted               Follow up in about 1 year (around 6/13/2025) for CPAP compliance dnld.    Thank you for the opportunity to participate in the care of this patient.

## 2024-06-13 ENCOUNTER — OFFICE VISIT (OUTPATIENT)
Dept: PULMONOLOGY | Facility: CLINIC | Age: 79
End: 2024-06-13
Payer: MEDICARE

## 2024-06-13 VITALS
BODY MASS INDEX: 25.22 KG/M2 | SYSTOLIC BLOOD PRESSURE: 110 MMHG | RESPIRATION RATE: 20 BRPM | HEIGHT: 66 IN | OXYGEN SATURATION: 96 % | WEIGHT: 156.94 LBS | DIASTOLIC BLOOD PRESSURE: 72 MMHG | HEART RATE: 82 BPM

## 2024-06-13 DIAGNOSIS — I49.9 CARDIAC ARRHYTHMIA, UNSPECIFIED CARDIAC ARRHYTHMIA TYPE: ICD-10-CM

## 2024-06-13 DIAGNOSIS — G47.33 OSA ON CPAP: Primary | ICD-10-CM

## 2024-06-13 DIAGNOSIS — I10 HYPERTENSION, UNSPECIFIED TYPE: ICD-10-CM

## 2024-06-13 PROCEDURE — 3074F SYST BP LT 130 MM HG: CPT | Mod: CPTII,S$GLB,, | Performed by: NURSE PRACTITIONER

## 2024-06-13 PROCEDURE — 1101F PT FALLS ASSESS-DOCD LE1/YR: CPT | Mod: CPTII,S$GLB,, | Performed by: NURSE PRACTITIONER

## 2024-06-13 PROCEDURE — 3078F DIAST BP <80 MM HG: CPT | Mod: CPTII,S$GLB,, | Performed by: NURSE PRACTITIONER

## 2024-06-13 PROCEDURE — 1160F RVW MEDS BY RX/DR IN RCRD: CPT | Mod: CPTII,S$GLB,, | Performed by: NURSE PRACTITIONER

## 2024-06-13 PROCEDURE — 99999 PR PBB SHADOW E&M-EST. PATIENT-LVL III: CPT | Mod: PBBFAC,,, | Performed by: NURSE PRACTITIONER

## 2024-06-13 PROCEDURE — 3288F FALL RISK ASSESSMENT DOCD: CPT | Mod: CPTII,S$GLB,, | Performed by: NURSE PRACTITIONER

## 2024-06-13 PROCEDURE — 1159F MED LIST DOCD IN RCRD: CPT | Mod: CPTII,S$GLB,, | Performed by: NURSE PRACTITIONER

## 2024-06-13 PROCEDURE — 99214 OFFICE O/P EST MOD 30 MIN: CPT | Mod: S$GLB,,, | Performed by: NURSE PRACTITIONER

## 2024-06-13 NOTE — ASSESSMENT & PLAN NOTE
01/11/2023, 01/12/2023 Home Sleep Study 2 nights moderate obstructive sleep apnea AHI: 21 RDI 29 <90% SpO2 0.5% Mean SpO2 95.9%  2/9/2023 Set Up Date Res Med WITH [facility]  On Auto CPAP of 5-20 cmH2O pressure which is optimally controlling sleep apnea with apneic index (AHI) 2.4 events an hour.   Pleased with settings, continue Auto CPAP 5-20 cm   Nasal pillows mask   [facility]

## 2024-06-13 NOTE — ASSESSMENT & PLAN NOTE
Continued management with cardiology   Continued adherence to CPAP nightly   1/17/2023 cardiac device check  Arrhythmic events (AE)   Nocturnal Sinus Bradycardia   Sinus Bradycardia   Intermittent Third Degree Heart block   Second Degree AV Block, Type I   Bradycardia event(s) recorded   Pause event(s) recorded   Device-identified arrhythmic events without corresponding EGMs and/or details noted

## 2024-07-29 DIAGNOSIS — R55 SYNCOPE AND COLLAPSE: ICD-10-CM

## 2024-07-29 DIAGNOSIS — I48.0 PAROXYSMAL ATRIAL FIBRILLATION: ICD-10-CM

## 2024-07-29 DIAGNOSIS — Z95.0 PRESENCE OF CARDIAC PACEMAKER: Primary | ICD-10-CM

## 2024-07-29 DIAGNOSIS — Z95.0 CARDIAC PACEMAKER IN SITU: ICD-10-CM

## 2024-08-09 ENCOUNTER — CLINICAL SUPPORT (OUTPATIENT)
Dept: CARDIOLOGY | Facility: HOSPITAL | Age: 79
End: 2024-08-09
Payer: MEDICARE

## 2024-08-09 DIAGNOSIS — Z95.0 PRESENCE OF CARDIAC PACEMAKER: ICD-10-CM

## 2024-08-15 ENCOUNTER — CLINICAL SUPPORT (OUTPATIENT)
Dept: CARDIOLOGY | Facility: HOSPITAL | Age: 79
End: 2024-08-15
Attending: INTERNAL MEDICINE
Payer: MEDICARE

## 2024-08-15 ENCOUNTER — CLINICAL SUPPORT (OUTPATIENT)
Dept: CARDIOLOGY | Facility: HOSPITAL | Age: 79
End: 2024-08-15
Payer: MEDICARE

## 2024-08-15 DIAGNOSIS — Z95.0 PRESENCE OF CARDIAC PACEMAKER: ICD-10-CM

## 2024-08-15 PROCEDURE — 93296 REM INTERROG EVL PM/IDS: CPT | Performed by: INTERNAL MEDICINE

## 2024-08-15 PROCEDURE — 93294 REM INTERROG EVL PM/LDLS PM: CPT | Mod: S$GLB,,, | Performed by: INTERNAL MEDICINE

## 2024-08-27 LAB
OHS CV AF BURDEN PERCENT: < 1
OHS CV DC REMOTE DEVICE TYPE: NORMAL
OHS CV RV PACING PERCENT: 79.56 %

## 2024-10-01 NOTE — PROGRESS NOTES
Subjective   Patient ID:  Johnie Vinson is a 79 y.o. male who presents for follow-up of Pacemaker Check      79 yoM HTN, PVD here for second opinion regarding need for PM.     11/22: He had near syncope leading to ILR implantation by Dr Tony Douglas with [facility]. He had nocturnal Mobitz I second degree AV block noted. There was discussion about PM implantation on his note 9/29/22. Given that there was nocturnal only AV block, no PM was planned. He was recently diagnosed with RED, CPAP not started. He has not had a syncopal event since ILR implant 5/22. Normal EF.     3/23: Bradycardia and transient AVB, mostly during nocturnal hours. Some of the times recorded, he was likely awake. He had high frequency of events. He was started on CPAP with recurrent, but less frequent transient AV block episodes.     7/23:  DC PM 3/15/23. 60% AP, 48% , no arrhythmias, no further syncope.     Interval history: Normal DC PM function with 75% and 72%, AP and , respectively.     Stress Echo 11/16: No ischemia  NM Stress 2017: No ischemia , nl EF  Echo 5/22: Normal EF 55-60%  ILR implanted 5/19/22    My interpretation of the ECG is:    Past Medical History:  No date: Arrhythmia  No date: Hypertension  No date: RED (obstructive sleep apnea)  No date: PAD (peripheral artery disease)  No date: Syncope and collapse    Past Surgical History:  3/15/2023: A-V CARDIAC PACEMAKER INSERTION; Left      Comment:  Procedure: INSERTION, CARDIAC PACEMAKER, DUAL CHAMBER;                 Surgeon: Mandeep Wadsworth MD;  Location: [facility] CATH                LAB;  Service: Cardiology;  Laterality: Left;  MDT                device/Patient to arrive at 8am  No date: ppm icd; N/A  3/15/2023: REMOVAL OF IMPLANTABLE LOOP RECORDER; N/A      Comment:  Procedure: REMOVAL, IMPLANTABLE LOOP RECORDER;  Surgeon:               Mandeep Wadsworth MD;  Location: [facility] CATH LAB;                 Service: Cardiology;  Laterality: N/A;  MRI                 safeMedtronic loop recorderM: [id number]:                [id number] 5/2022 by LAUREEN Douglas for syncope.    Social History    Socioeconomic History      Marital status: Single    Tobacco Use      Smoking status: Former        Types: Cigarettes      Smokeless tobacco: Never    Substance and Sexual Activity      Alcohol use: Never      Drug use: Never      Sexual activity: Never    Social Drivers of Health  Financial Resource Strain: Patient Declined (2/15/2024)      [facility] [place] [facility] [facility] [place] [facility]      Overall Financial Resource Strain (CARDIA)          Difficulty of Paying Living Expenses: Patient declined  Food Insecurity: Patient Declined (2/15/2024)      [facility], [facility]      Hunger Vital Sign          Worried About Running Out of Food in the Last Year: Patient declined           Ran Out of Food in the Last Year: Patient declined  Transportation Needs: No Transportation Needs (2/15/2024)      [facility] [place] [facility]      PRAPARE - Transportation          Lack of Transportation (Medical): No          Lack of Transportation (Non-Medical): No  Physical Activity: Inactive (2/15/2024)      Received from [facility], [place] [facility]      Exercise Vital Sign          Days of Exercise per Week: 0 days          Minutes of Exercise per Session: 0 min  Stress: No Stress Concern Present (2/15/2024)       [facility] [place], [facility]      [employer] - Occupational Stress Questionnaire          Feeling of Stress : Not at all  Housing Stability: Low Risk  (2/15/2024)      [facility] [place], [facility]      Housing Stability Vital Sign          Unable to Pay for Housing in the Last Year: No          Number of Places Lived in the Last Year: 1          Unstable Housing in the Last Year: No    No family history on file.      Current Outpatient Medications:  alprostadiL (EDEX) 20 mcg injection, 20 mcg by Intracavitary route daily as needed., Disp: , Rfl:   aspirin (ECOTRIN) 81 MG EC tablet, Take 81 mg by mouth., Disp: , Rfl:   atorvastatin (LIPITOR) 20 MG tablet, Take 20 mg by mouth once daily., Disp: , Rfl:   cetirizine (ZYRTEC) 10 MG tablet, Take 1 tablet by mouth in the morning for 7 days, Disp: , Rfl:   lisinopriL 10 MG tablet, Take 10 mg by mouth once daily., Disp: , Rfl:   oxyCODONE-acetaminophen (PERCOCET) 7.5-325 mg per tablet, Take 1 tablet by mouth every 4 (four) hours as needed for Pain., Disp: 20 tablet, Rfl: 0  sildenafiL (VIAGRA) 100 MG tablet, Take 100 mg by mouth daily as needed., Disp: , Rfl:     No current facility-administered medications for this visit.          Review of Systems   Constitutional: Negative.   HENT: Negative.     Eyes: Negative.    Cardiovascular:  Negative for chest pain, dyspnea on exertion, leg swelling, near-syncope, palpitations and syncope.   Respiratory: Negative.  Negative for shortness of breath.    Endocrine: Negative.    Hematologic/Lymphatic: Negative.    Skin: Negative.    Musculoskeletal: Negative.    Gastrointestinal: Negative.     Genitourinary: Negative.    Neurological:  Negative for dizziness and light-headedness.   Psychiatric/Behavioral: Negative.     Allergic/Immunologic: Negative.           Objective     Physical Exam  Vitals reviewed.   Constitutional:       General: He is not in acute distress.     Appearance: He is well-developed.   HENT:      Head: Normocephalic and atraumatic.   Eyes:      Pupils: Pupils are equal, round, and reactive to light.   Neck:      Thyroid: No thyromegaly.      Vascular: No JVD.   Cardiovascular:      Rate and Rhythm: Normal rate and regular rhythm.      Chest Wall: PMI is not displaced.      Heart sounds: Normal heart sounds, S1 normal and S2 normal. No murmur heard.     No friction rub. No gallop.   Pulmonary:      Effort: Pulmonary effort is normal. No respiratory distress.      Breath sounds: Normal breath sounds. No wheezing or rales.   Abdominal:      General: Bowel sounds are normal. There is no distension.      Palpations: Abdomen is soft.      Tenderness: There is no abdominal tenderness. There is no guarding or rebound.   Musculoskeletal:         General: No tenderness. Normal range of motion.      Cervical back: Normal range of motion.   Skin:     General: Skin is warm and dry.      Findings: No erythema or rash.   Neurological:      Mental Status: He is alert and oriented to person, place, and time.      Cranial Nerves: No cranial nerve deficit.   Psychiatric:         Behavior: Behavior normal.         Thought Content: Thought content normal.         Judgment: Judgment normal.            Assessment and Plan     1. Cardiac pacemaker in situ    2. Syncope and collapse        Plan:  79 yoM here for PM management. Normal DC PM function with 70+% AP/. No syncope. I discussed routine device follow up including quarterly to bi-annual device checks for device function as well as yearly follow up in the EP clinic. The patient  was advised to call with any concerns regarding their device. Device  clinic follow up as scheduled. RTC 1y

## 2024-10-02 ENCOUNTER — OFFICE VISIT (OUTPATIENT)
Dept: CARDIOLOGY | Facility: CLINIC | Age: 79
End: 2024-10-02
Payer: MEDICARE

## 2024-10-02 ENCOUNTER — HOSPITAL ENCOUNTER (OUTPATIENT)
Dept: CARDIOLOGY | Facility: HOSPITAL | Age: 79
Discharge: HOME | End: 2024-10-02
Attending: INTERNAL MEDICINE
Payer: MEDICARE

## 2024-10-02 VITALS
DIASTOLIC BLOOD PRESSURE: 60 MMHG | HEART RATE: 102 BPM | BODY MASS INDEX: 24.13 KG/M2 | RESPIRATION RATE: 16 BRPM | HEIGHT: 66 IN | OXYGEN SATURATION: 95 % | SYSTOLIC BLOOD PRESSURE: 108 MMHG | WEIGHT: 150.13 LBS

## 2024-10-02 DIAGNOSIS — I48.0 PAROXYSMAL ATRIAL FIBRILLATION: ICD-10-CM

## 2024-10-02 DIAGNOSIS — Z95.0 CARDIAC PACEMAKER IN SITU: ICD-10-CM

## 2024-10-02 DIAGNOSIS — Z95.0 CARDIAC PACEMAKER IN SITU: Primary | ICD-10-CM

## 2024-10-02 DIAGNOSIS — R55 SYNCOPE AND COLLAPSE: ICD-10-CM

## 2024-10-02 DIAGNOSIS — Z95.0 PRESENCE OF CARDIAC PACEMAKER: ICD-10-CM

## 2024-10-02 PROCEDURE — 99214 OFFICE O/P EST MOD 30 MIN: CPT | Mod: S$GLB,,, | Performed by: INTERNAL MEDICINE

## 2024-10-02 PROCEDURE — 3078F DIAST BP <80 MM HG: CPT | Mod: CPTII,S$GLB,, | Performed by: INTERNAL MEDICINE

## 2024-10-02 PROCEDURE — 99999 PR PBB SHADOW E&M-EST. PATIENT-LVL III: CPT | Mod: PBBFAC,,, | Performed by: INTERNAL MEDICINE

## 2024-10-02 PROCEDURE — 1101F PT FALLS ASSESS-DOCD LE1/YR: CPT | Mod: CPTII,S$GLB,, | Performed by: INTERNAL MEDICINE

## 2024-10-02 PROCEDURE — 93280 PM DEVICE PROGR EVAL DUAL: CPT

## 2024-10-02 PROCEDURE — 3288F FALL RISK ASSESSMENT DOCD: CPT | Mod: CPTII,S$GLB,, | Performed by: INTERNAL MEDICINE

## 2024-10-02 PROCEDURE — 1159F MED LIST DOCD IN RCRD: CPT | Mod: CPTII,S$GLB,, | Performed by: INTERNAL MEDICINE

## 2024-10-02 PROCEDURE — 3074F SYST BP LT 130 MM HG: CPT | Mod: CPTII,S$GLB,, | Performed by: INTERNAL MEDICINE

## 2025-01-29 ENCOUNTER — TELEPHONE (OUTPATIENT)
Dept: CARDIOLOGY | Facility: HOSPITAL | Age: 80
End: 2025-01-29
Payer: MEDICARE

## 2025-01-29 ENCOUNTER — CLINICAL SUPPORT (OUTPATIENT)
Dept: CARDIOLOGY | Facility: HOSPITAL | Age: 80
End: 2025-01-29

## 2025-01-29 ENCOUNTER — CLINICAL SUPPORT (OUTPATIENT)
Dept: CARDIOLOGY | Facility: HOSPITAL | Age: 80
End: 2025-01-29
Attending: INTERNAL MEDICINE
Payer: MEDICARE

## 2025-01-29 DIAGNOSIS — I49.5 SICK SINUS SYNDROME: ICD-10-CM

## 2025-01-29 DIAGNOSIS — Z95.0 PRESENCE OF CARDIAC PACEMAKER: ICD-10-CM

## 2025-01-29 PROCEDURE — 93294 REM INTERROG EVL PM/LDLS PM: CPT | Mod: S$GLB,,, | Performed by: INTERNAL MEDICINE

## 2025-01-29 PROCEDURE — 93296 REM INTERROG EVL PM/IDS: CPT | Performed by: INTERNAL MEDICINE

## 2025-01-29 NOTE — TELEPHONE ENCOUNTER
----- Message from DIONNE Duron sent at 1/29/2025  2:44 PM CST -----  Contact: Johnie  Please give pt a call -thanks  ----- Message -----  From: Ruddy Jerry  Sent: 1/29/2025  10:09 AM CST  To: [facility] Clinical Support    Type:  Patient Requesting a call back     Who Called:Johnie  What is the call back request regarding?:Requesting a call back from Marva in regard to his device not working properly   Would the patient rather a call back or a response via [facility]?call  Best Call Back Number:[phone]   Additional Information:

## 2025-01-29 NOTE — TELEPHONE ENCOUNTER
I have attempted without success to contact this patient by phone to to discuss his Medtronic [employer] home monitor.  No answer, left message providing information below, instructed him to call device clinic with any further questions.    CareLink home monitor transmitted today, Jan 29th, at 106pm.  Report has been received, once signed by Dr. Wadsworth it will be available on pt's [employer] under test results.

## 2025-02-21 ENCOUNTER — TELEPHONE (OUTPATIENT)
Dept: CARDIOLOGY | Facility: CLINIC | Age: 80
End: 2025-02-21
Payer: MEDICARE

## 2025-02-21 ENCOUNTER — TELEPHONE (OUTPATIENT)
Dept: ELECTROPHYSIOLOGY | Facility: CLINIC | Age: 80
End: 2025-02-21
Payer: MEDICARE

## 2025-02-21 NOTE — TELEPHONE ENCOUNTER
Spoke with the patient regarding him going to [place] to see dr strickland. I gave the patient the [place] number and also sent his nurse in [place] a message to reach out to assist the patient with schduleing.

## 2025-02-21 NOTE — TELEPHONE ENCOUNTER
----- Message from Elliott Potter sent at 2/21/2025  9:02 AM CST -----    ----- Message -----  From: Ancelmo Mao  Sent: 2/21/2025   8:52 AM CST  To: Ananth Kaufman Staff    Patient is calling in regards to scheduling an appointment for a clearance for a double hernia surgery. Surgery date is March 11, 2025. He can be reached at [phone]. Thank you

## 2025-02-21 NOTE — TELEPHONE ENCOUNTER
Spoke with pt, reports he was able to get an appt with Dr Monk to get clearance for his upcoming procedure

## 2025-02-24 DIAGNOSIS — I48.0 PAROXYSMAL ATRIAL FIBRILLATION: ICD-10-CM

## 2025-02-24 DIAGNOSIS — I10 HYPERTENSION, UNSPECIFIED TYPE: Primary | ICD-10-CM

## 2025-02-25 ENCOUNTER — OFFICE VISIT (OUTPATIENT)
Dept: CARDIOLOGY | Facility: CLINIC | Age: 80
End: 2025-02-25
Payer: MEDICARE

## 2025-02-25 ENCOUNTER — HOSPITAL ENCOUNTER (OUTPATIENT)
Dept: CARDIOLOGY | Facility: HOSPITAL | Age: 80
Discharge: HOME | End: 2025-02-25
Attending: INTERNAL MEDICINE
Payer: MEDICARE

## 2025-02-25 VITALS
OXYGEN SATURATION: 96 % | WEIGHT: 151.88 LBS | SYSTOLIC BLOOD PRESSURE: 116 MMHG | BODY MASS INDEX: 24.41 KG/M2 | HEIGHT: 66 IN | HEART RATE: 62 BPM | DIASTOLIC BLOOD PRESSURE: 60 MMHG

## 2025-02-25 DIAGNOSIS — I65.23 BILATERAL CAROTID ARTERY STENOSIS: ICD-10-CM

## 2025-02-25 DIAGNOSIS — I48.0 PAROXYSMAL ATRIAL FIBRILLATION: ICD-10-CM

## 2025-02-25 DIAGNOSIS — I10 HYPERTENSION, UNSPECIFIED TYPE: ICD-10-CM

## 2025-02-25 DIAGNOSIS — Z01.810 PREOP CARDIOVASCULAR EXAM: Primary | ICD-10-CM

## 2025-02-25 DIAGNOSIS — Z95.0 CARDIAC PACEMAKER IN SITU: ICD-10-CM

## 2025-02-25 DIAGNOSIS — R01.1 MURMUR, CARDIAC: ICD-10-CM

## 2025-02-25 DIAGNOSIS — I10 PRIMARY HYPERTENSION: ICD-10-CM

## 2025-02-25 DIAGNOSIS — I73.9 PAD (PERIPHERAL ARTERY DISEASE): ICD-10-CM

## 2025-02-25 LAB
OHS QRS DURATION: 144 MS
OHS QTC CALCULATION: 492 MS

## 2025-02-25 PROCEDURE — 93005 ELECTROCARDIOGRAM TRACING: CPT

## 2025-02-25 PROCEDURE — 1126F AMNT PAIN NOTED NONE PRSNT: CPT | Mod: CPTII,S$GLB,, | Performed by: INTERNAL MEDICINE

## 2025-02-25 PROCEDURE — 3074F SYST BP LT 130 MM HG: CPT | Mod: CPTII,S$GLB,, | Performed by: INTERNAL MEDICINE

## 2025-02-25 PROCEDURE — 3288F FALL RISK ASSESSMENT DOCD: CPT | Mod: CPTII,S$GLB,, | Performed by: INTERNAL MEDICINE

## 2025-02-25 PROCEDURE — 1160F RVW MEDS BY RX/DR IN RCRD: CPT | Mod: CPTII,S$GLB,, | Performed by: INTERNAL MEDICINE

## 2025-02-25 PROCEDURE — 1159F MED LIST DOCD IN RCRD: CPT | Mod: CPTII,S$GLB,, | Performed by: INTERNAL MEDICINE

## 2025-02-25 PROCEDURE — 99999 PR PBB SHADOW E&M-EST. PATIENT-LVL III: CPT | Mod: PBBFAC,,, | Performed by: INTERNAL MEDICINE

## 2025-02-25 PROCEDURE — 3078F DIAST BP <80 MM HG: CPT | Mod: CPTII,S$GLB,, | Performed by: INTERNAL MEDICINE

## 2025-02-25 PROCEDURE — 1101F PT FALLS ASSESS-DOCD LE1/YR: CPT | Mod: CPTII,S$GLB,, | Performed by: INTERNAL MEDICINE

## 2025-02-25 PROCEDURE — 93010 ELECTROCARDIOGRAM REPORT: CPT | Mod: ,,, | Performed by: INTERNAL MEDICINE

## 2025-02-25 PROCEDURE — 99214 OFFICE O/P EST MOD 30 MIN: CPT | Mod: S$GLB,,, | Performed by: INTERNAL MEDICINE

## 2025-02-25 RX ORDER — LISINOPRIL 5 MG/1
5 TABLET ORAL DAILY
Qty: 90 TABLET | Refills: 2 | Status: SHIPPED | OUTPATIENT
Start: 2025-02-25

## 2025-02-25 NOTE — PROGRESS NOTES
Subjective:   Patient ID:  Johnie Vinson is a 80 y.o. male who presents for follow up of No chief complaint on file.      81 yo male, came in for preop clearance of double inguinal hernia repair  PMH s/p PPM HTn HLD no h/o MI DM CVA. Quit smoking yrs ago.  F/u at arrhythmia clinic Dr. Wadsworth  Denied chest pain, dyspnea on exertion, palpitation, fainting, PND, orthopnea, syncope and claudication.   EKG reviewed by myself today reveals AV pacing  Stress Echo 11/16: No ischemia  NM Stress 2017: No ischemia , nl EF  Echo 5/22: Normal EF 55-60%  09/22 carotid US showed both sides 40 to 59% lesions  BP LDL 53 and A1c controled   Denied chest pain, dyspnea on exertion, palpitation, fainting, PND, orthopnea, syncope and claudication.   Exercise regularly  Recent;y took 1/2 tab lisinopril due to low BP  F/u vascular surgery at [facility]                Past Medical History:   Diagnosis Date    Arrhythmia     Hypertension     RED (obstructive sleep apnea)     PAD (peripheral artery disease)     Syncope and collapse        Past Surgical History:   Procedure Laterality Date    A-V CARDIAC PACEMAKER INSERTION Left 3/15/2023    Procedure: INSERTION, CARDIAC PACEMAKER, DUAL CHAMBER;  Surgeon: Mandeep Wadsworth MD;  Location: [facility] CATH LAB;  Service: Cardiology;  Laterality: Left;  MDT device/Patient to arrive at 8am    ppm icd N/A     REMOVAL OF IMPLANTABLE LOOP RECORDER N/A 3/15/2023    Procedure: REMOVAL, IMPLANTABLE LOOP RECORDER;  Surgeon: Mandeep Wadsworth MD;  Location: [facility] CATH LAB;  Service: Cardiology;  Laterality: N/A;  MRI safe  Medtronic loop recorder  M: [id number]  SN: [id number]  Implanted 5/2022 by LAUREEN Douglas for syncope.       Social History[1]    No family history on file.      ROS    Objective:   Physical Exam  HENT:      Head: Normocephalic.   Eyes:      Pupils: Pupils are equal, round, and reactive to light.   Neck:      Thyroid: No thyromegaly.      Vascular: Normal carotid pulses. No carotid bruit or  "JVD.   Cardiovascular:      Rate and Rhythm: Normal rate and regular rhythm. No extrasystoles are present.     Chest Wall: PMI is not displaced.      Pulses: Normal pulses.      Heart sounds: Murmur heard.      No gallop. No S3 sounds.   Pulmonary:      Effort: No respiratory distress.      Breath sounds: Normal breath sounds. No stridor.   Abdominal:      General: Bowel sounds are normal.      Palpations: Abdomen is soft.      Tenderness: There is no abdominal tenderness. There is no rebound.   Skin:     Findings: No rash.   Neurological:      Mental Status: He is alert and oriented to person, place, and time.   Psychiatric:         Behavior: Behavior normal.       Lab Results   Component Value Date    CHOL 131 02/15/2024    CHOL 136 12/06/2022    CHOL 152 11/08/2021     Lab Results   Component Value Date    HDL 40 02/15/2024    HDL 34 (L) 12/06/2022    HDL 38 (L) 11/08/2021     Lab Results   Component Value Date    LDLCALC 73 02/15/2024    LDLCALC 85 12/06/2022    LDLCALC 93 11/08/2021     Lab Results   Component Value Date    TRIG 92 02/15/2024    TRIG 89 12/06/2022    TRIG 117 11/08/2021     No results found for: "CHOLHDL"    Chemistry        Component Value Date/Time     03/03/2023 0820    K 4.0 03/03/2023 0820     03/03/2023 0820    CO2 28 03/03/2023 0820    BUN 22 03/03/2023 0820    CREATININE 1.0 03/03/2023 0820     03/03/2023 0820        Component Value Date/Time    CALCIUM 9.0 03/03/2023 0820    ESTGFRAFRICA 106 07/18/2020 0326          No results found for: "LABA1C", "HGBA1C"  Lab Results   Component Value Date    TSH 2.970 02/15/2024     Lab Results   Component Value Date    INR 1.0 04/20/2023    INR 1.1 03/03/2023    INR 1.1 07/18/2020     Lab Results   Component Value Date    WBC 5.8 02/15/2024    HGB 15.1 02/15/2024    HCT 46.6 02/15/2024     (H) 03/03/2023     02/15/2024     BMP  Sodium   Date Value Ref Range Status   03/03/2023 139 136 - 145 mmol/L Final "     Potassium   Date Value Ref Range Status   03/03/2023 4.0 3.5 - 5.1 mmol/L Final     Chloride   Date Value Ref Range Status   03/03/2023 106 95 - 110 mmol/L Final     CO2   Date Value Ref Range Status   03/03/2023 28 23 - 29 mmol/L Final     BUN   Date Value Ref Range Status   03/03/2023 22 8 - 23 mg/dL Final     Creatinine   Date Value Ref Range Status   03/03/2023 1.0 0.5 - 1.4 mg/dL Final     Calcium   Date Value Ref Range Status   03/03/2023 9.0 8.7 - 10.5 mg/dL Final     Anion Gap   Date Value Ref Range Status   03/03/2023 5 (L) 8 - 16 mmol/L Final     eGFR    Date Value Ref Range Status   07/18/2020 106 >=60 mL/min/1.73mSq Final     BNP  @LABRCNTIP(BNP,BNPTRIAGEBLO)@  @LABRCNTIP(troponini)@  CrCl cannot be calculated (Patient's most recent lab result is older than the maximum 7 days allowed.).  No results found in the last 24 hours.  No results found in the last 24 hours.  No results found in the last 24 hours.    Assessment:      1. Preop cardiovascular exam    2. Paroxysmal atrial fibrillation    3. Cardiac pacemaker in situ    4. Primary hypertension    5. PAD (peripheral artery disease)    6. Bilateral carotid artery stenosis    7. Murmur, cardiac        Plan:   Elevated periop risk of CV events for non-high risk procedure.  Ok to proceed the scheduled procedure without further cardiac study.  OK to hold ASA 5 days before the procedure and resume postop once hemodynamically stable    Echo for murmur  Carotid US     Continue asa and statin  Continue Lisinopril 5 mg daily  F/u CVT for PAD and carotid A dz  RTc in 6 m                        [1]  Social History  Tobacco Use    Smoking status: Former     Types: Cigarettes    Smokeless tobacco: Never   Substance Use Topics    Alcohol use: Never    Drug use: Never

## 2025-04-07 DIAGNOSIS — R55 SYNCOPE AND COLLAPSE: Primary | ICD-10-CM

## 2025-04-08 ENCOUNTER — TELEPHONE (OUTPATIENT)
Dept: CARDIOLOGY | Facility: CLINIC | Age: 80
End: 2025-04-08
Payer: MEDICARE

## 2025-04-08 NOTE — TELEPHONE ENCOUNTER
Left a message that I rtnd his call pbr    ----- Message from Med Assistant Abbey sent at 4/7/2025  3:24 PM CDT -----  Regarding: FW: appt request  Pt needs to be seen by MB or MAY  ----- Message -----  From: Shaila Bray  Sent: 4/7/2025   3:19 PM CDT  To: Abbey Bailey MA  Subject: RE: appt request                                 He said Dr. Wadsworth was he doctor and want to speak with him. He stated he was calling somewhere else as well.  ----- Message -----  From: Abbey Bailey MA  Sent: 4/7/2025   3:13 PM CDT  To: Shaila Bray  Subject: RE: appt request                                 That would need to go to general cardiologist.  ----- Message -----  From: Shaila Bray  Sent: 4/7/2025   2:59 PM CDT  To: Ananth Kaufman Staff  Subject: appt request                                     Name of Who is Calling: Johnie What is the request in detail: Patient is requesting a call back to schedule an appointment due to condition pneumonia and thinks he may have lung damage.  Can the clinic reply by [facility]: No What Number to Call Back if not in [facility]:  [phone]

## 2025-04-10 ENCOUNTER — TELEPHONE (OUTPATIENT)
Dept: PULMONOLOGY | Facility: CLINIC | Age: 80
End: 2025-04-10
Payer: MEDICARE

## 2025-04-10 ENCOUNTER — TELEPHONE (OUTPATIENT)
Dept: CARDIOLOGY | Facility: CLINIC | Age: 80
End: 2025-04-10
Payer: MEDICARE

## 2025-04-10 NOTE — TELEPHONE ENCOUNTER
Called the pt for a 2nd time pt picked up and hung up. Called back to make sure it was not a mistake. Got his vm left a message to contact me back pbr    PB  Left a message that I rtnd his call pbr     ----- Message from Med Assistant Abbey sent at 4/7/2025  3:24 PM CDT -----  Regarding: FW: appt request  Pt needs to be seen by MB or MAY  ----- Message -----  From: Shaila Bray  Sent: 4/7/2025   3:19 PM CDT  To: Abbey Bailey MA  Subject: RE: appt request                                  He said Dr. Wadsworth was he doctor and want to speak with him. He stated he was calling somewhere else as well.  ----- Message -----  From: Abbey Bailey MA  Sent: 4/7/2025   3:13 PM CDT  To: Shaila Bray  Subject: RE: appt request                                  That would need to go to general cardiologist.  ----- Message -----  From: Shaila Bray  Sent: 4/7/2025   2:59 PM CDT  To: Ananth Kaufman Staff  Subject: appt request                                      Name of Who is Calling: Johnie What is the request in detail: Patient is requesting a call back to schedule an appointment due to condition pneumonia and thinks he may have lung damage.  Can the clinic reply by [facility]: No What Number to Call Back if not in [facility]:        [phone]

## 2025-04-24 ENCOUNTER — OFFICE VISIT (OUTPATIENT)
Dept: PULMONOLOGY | Facility: CLINIC | Age: 80
End: 2025-04-24
Payer: MEDICARE

## 2025-04-24 VITALS
WEIGHT: 145.5 LBS | OXYGEN SATURATION: 93 % | HEART RATE: 81 BPM | SYSTOLIC BLOOD PRESSURE: 122 MMHG | DIASTOLIC BLOOD PRESSURE: 70 MMHG | RESPIRATION RATE: 15 BRPM | HEIGHT: 66 IN | BODY MASS INDEX: 23.38 KG/M2

## 2025-04-24 DIAGNOSIS — J45.20 MILD INTERMITTENT ASTHMA WITHOUT COMPLICATION: ICD-10-CM

## 2025-04-24 DIAGNOSIS — J84.9 INTERSTITIAL LUNG DISEASE: Primary | ICD-10-CM

## 2025-04-24 DIAGNOSIS — G47.33 OSA (OBSTRUCTIVE SLEEP APNEA): ICD-10-CM

## 2025-04-24 PROCEDURE — 99999 PR PBB SHADOW E&M-EST. PATIENT-LVL III: CPT | Mod: PBBFAC,,, | Performed by: STUDENT IN AN ORGANIZED HEALTH CARE EDUCATION/TRAINING PROGRAM

## 2025-04-24 NOTE — PROGRESS NOTES
Chief complaint:  Chief Complaint   Patient presents with    Pneumonia        History of present illness -  Pneumonia  He complains of shortness of breath.      Johnie comes to clinic today to establish care from a pulmonary perspective.    He was not established clinic patient is since 2023 for moderate RED which he is compliant with.  He reportedly had a mild-to-moderate community acquired pneumonia that was with 10 days of antibiotics as an outpatient diagnosed earlier in the year in mid to late March, this unfortunately delayed his robotic assisted bilateral inguinal repair that was performed on 04/08/2025.  His symptoms have greatly improved since completing antibiotic therapy; he has lost a few lb perhaps 6-8 in the last few months but overall feels well from a respiratory standpoint    Symptoms occasional shortness of breath  Tobacco use: Lifetime non-smoker stopped proximally 10 years ago, he has an approximate 30 pack-year history   Occupational history:  Noncontributory    Physical examination -   Physical Exam  Vitals and nursing note reviewed.   Constitutional:       Appearance: Normal appearance.      Comments: Thin   HENT:      Head: Normocephalic and atraumatic.      Nose: Nose normal.      Mouth/Throat:      Mouth: Mucous membranes are moist.   Eyes:      Extraocular Movements: Extraocular movements intact.      Pupils: Pupils are equal, round, and reactive to light.   Cardiovascular:      Rate and Rhythm: Normal rate and regular rhythm.   Pulmonary:      Effort: Pulmonary effort is normal.      Breath sounds: Normal breath sounds.   Abdominal:      General: Abdomen is flat. Bowel sounds are normal.      Palpations: Abdomen is soft.   Musculoskeletal:         General: No swelling.      Right lower leg: Edema present.      Left lower leg: Edema present.   Skin:     General: Skin is warm.      Capillary Refill: Capillary refill takes less than 2 seconds.   Neurological:      General: No focal deficit  "present.      Mental Status: He is alert.      Vitals:    25 1010   BP: 122/70   Pulse: 81   Resp: 15   SpO2: (!) 93%   Weight: 66 kg (145 lb 8.1 oz)   Height: 5' 6" (1.676 m)      Review of Systems   Constitutional: Negative.    HENT: Negative.     Eyes: Negative.    Respiratory:  Positive for shortness of breath.    Cardiovascular: Negative.    Gastrointestinal: Negative.    Musculoskeletal: Negative.    Skin: Negative.    Neurological: Negative.        Relevant data (Independently reviewed by me) -    Prior notes -   Cardiology    Labs -   Absolute eosinophil count on blood work performed earlier this year was 180  CMP is essentially unremarkable.    Spirometry -  Not available    Imaging -   Not available, all reports available of an x-ray of  which reports interstitial changes  Pending echo, last reported echo from  claims to have a normal EF    Sleep studies - (Not interpreted by me)  2023 home sleep study consistent with moderate RED with an AHI of 21    CPAP compliance      Assessment & Plan    Interstitial lung disease  -     Stress test, pulmonary; Future; Expected date: 2025  -     Complete PFT with bronchodilator; Future; Expected date: 2025  -     CBC Auto Differential; Future; Expected date: 2025  -     Fraction of  Nitric Oxide; Future; Expected date: 2025  -     IgE; Future; Expected date: 2025  -     CT Chest Without Contrast; Future; Expected date: 2025    Mild intermittent asthma without complication  -     IgE; Future; Expected date: 2025    RED (obstructive sleep apnea)     Unsure about etiology of patient's symptoms, we will start with typical investigations of his respiratory system including a CT scan of the chest, blood work, breathing test and a 6 minute walk test.    RTC in 2 months    Addison Mayers   2025          "

## 2025-04-30 ENCOUNTER — CLINICAL SUPPORT (OUTPATIENT)
Dept: CARDIOLOGY | Facility: HOSPITAL | Age: 80
End: 2025-04-30
Attending: INTERNAL MEDICINE
Payer: MEDICARE

## 2025-04-30 ENCOUNTER — CLINICAL SUPPORT (OUTPATIENT)
Dept: CARDIOLOGY | Facility: HOSPITAL | Age: 80
End: 2025-04-30
Payer: MEDICARE

## 2025-04-30 DIAGNOSIS — Z95.0 PRESENCE OF CARDIAC PACEMAKER: ICD-10-CM

## 2025-04-30 DIAGNOSIS — I49.5 SICK SINUS SYNDROME: ICD-10-CM

## 2025-04-30 PROCEDURE — 93296 REM INTERROG EVL PM/IDS: CPT | Performed by: INTERNAL MEDICINE

## 2025-04-30 PROCEDURE — 93294 REM INTERROG EVL PM/LDLS PM: CPT | Mod: S$GLB,,, | Performed by: INTERNAL MEDICINE

## 2025-05-08 ENCOUNTER — CLINICAL SUPPORT (OUTPATIENT)
Dept: PULMONOLOGY | Facility: CLINIC | Age: 80
End: 2025-05-08
Payer: MEDICARE

## 2025-05-08 ENCOUNTER — HOSPITAL ENCOUNTER (OUTPATIENT)
Dept: RADIOLOGY | Facility: HOSPITAL | Age: 80
Discharge: HOME | End: 2025-05-08
Attending: STUDENT IN AN ORGANIZED HEALTH CARE EDUCATION/TRAINING PROGRAM
Payer: MEDICARE

## 2025-05-08 VITALS — BODY MASS INDEX: 23.81 KG/M2 | HEIGHT: 66 IN | WEIGHT: 148.13 LBS

## 2025-05-08 DIAGNOSIS — J84.9 INTERSTITIAL LUNG DISEASE: ICD-10-CM

## 2025-05-08 LAB
BRPFT: ABNORMAL
DLCO SINGLE BREATH LLN: 14.52
DLCO SINGLE BREATH PRE REF: 48.8 %
DLCO SINGLE BREATH REF: 21.45
DLCOC SBVA LLN: 2.13
DLCOC SBVA REF: 3.4
DLCOC SINGLE BREATH LLN: 14.52
DLCOC SINGLE BREATH REF: 21.45
DLCOVA LLN: 2.13
DLCOVA PRE REF: 52.3 %
DLCOVA PRE: 1.78 ML/(MIN*MMHG*L) (ref 2.13–4.66)
DLCOVA REF: 3.4
ERV LLN: -16449.2
ERV PRE REF: 102.4 %
ERV REF: 0.8
FEF 25 75 CHG: -24.4 %
FEF 25 75 LLN: 0.72
FEF 25 75 POST REF: 71.1 %
FEF 25 75 PRE REF: 94.1 %
FEF 25 75 REF: 2.43
FET100 CHG: 156.6 %
FEV1 CHG: 2.6 %
FEV1 FVC CHG: -1.7 %
FEV1 FVC LLN: 60
FEV1 FVC POST REF: 88.5 %
FEV1 FVC PRE REF: 90.1 %
FEV1 FVC REF: 75
FEV1 LLN: 1.69
FEV1 POST REF: 111.8 %
FEV1 PRE REF: 108.9 %
FEV1 REF: 2.47
FRCPLETH LLN: 2.58
FRCPLETH PREREF: 121 %
FRCPLETH REF: 3.57
FVC CHG: 4.5 %
FVC LLN: 2.37
FVC POST REF: 125.2 %
FVC PRE REF: 119.9 %
FVC REF: 3.31
IVC PRE: 3.81 L (ref 2.37–4.26)
IVC SINGLE BREATH LLN: 2.37
IVC SINGLE BREATH PRE REF: 115.1 %
IVC SINGLE BREATH REF: 3.31
MVV LLN: 80
MVV PRE REF: 91.3 %
MVV REF: 94
PEF CHG: -26.4 %
PEF LLN: 4.04
PEF POST REF: 78.5 %
PEF PRE REF: 106.7 %
PEF REF: 6.1
POST FEF 25 75: 1.73 L/S (ref 0.72–4.14)
POST FET 100: 6.59 SEC
POST FEV1 FVC: 66.43 % (ref 59.87–88.93)
POST FEV1: 2.76 L (ref 1.69–3.18)
POST FVC: 4.15 L (ref 2.37–4.26)
POST PEF: 4.79 L/S (ref 4.04–8.16)
PRE DLCO: 10.47 ML/(MIN*MMHG) (ref 14.52–28.38)
PRE ERV: 0.82 L (ref -16449.2–16450.8)
PRE FEF 25 75: 2.28 L/S (ref 0.72–4.14)
PRE FET 100: 2.57 SEC
PRE FEV1 FVC: 67.62 % (ref 59.87–88.93)
PRE FEV1: 2.69 L (ref 1.69–3.18)
PRE FRC PL: 4.32 L (ref 2.58–4.56)
PRE FVC: 3.97 L (ref 2.37–4.26)
PRE MVV: 85.47 L/MIN (ref 79.58–107.66)
PRE PEF: 6.51 L/S (ref 4.04–8.16)
PRE RV: 3.5 L (ref 2.1–3.44)
PRE TLC: 7.47 L (ref 5.16–7.47)
RAW LLN: 3.06
RAW PRE REF: 82.9 %
RAW PRE: 2.54 CMH2O*S/L (ref 3.06–3.06)
RAW REF: 3.06
RV LLN: 2.1
RV PRE REF: 126.4 %
RV REF: 2.77
RVTLC LLN: 37
RVTLC PRE REF: 102 %
RVTLC PRE: 46.85 % (ref 36.96–54.92)
RVTLC REF: 46
TLC LLN: 5.16
TLC PRE REF: 118.3 %
TLC REF: 6.31
VA PRE: 5.89 L (ref 6.16–6.16)
VA SINGLE BREATH LLN: 6.16
VA SINGLE BREATH PRE REF: 95.5 %
VA SINGLE BREATH REF: 6.16
VC LLN: 2.37
VC PRE REF: 119.9 %
VC PRE: 3.97 L (ref 2.37–4.26)
VC REF: 3.31

## 2025-05-08 PROCEDURE — 71250 CT THORAX DX C-: CPT | Mod: 26,,, | Performed by: RADIOLOGY

## 2025-05-08 PROCEDURE — 71250 CT THORAX DX C-: CPT | Mod: TC

## 2025-05-08 PROCEDURE — 99999 PR PBB SHADOW E&M-EST. PATIENT-LVL I: CPT | Mod: PBBFAC,,,

## 2025-05-08 NOTE — PROCEDURES
"[facility]  Six Minute Walk     SUMMARY     Ordering Provider: Talib ROGERS   Interpreting Provider: Jayda ROGERS  Performing nurse/tech/RT: LS RRT  Diagnosis: Interstitial Lung Disease  Height: 5' 6" (167.6 cm)  Weight: 67.2 kg (148 lb 2.4 oz)  BMI (Calculated): 23.9                Phase Oxygen Assessment Supplemental O2 Heart   Rate Blood Pressure Tatiana Dyspnea Scale Rating   Resting 94 % Room Air 63 bpm 116/54 1   Exercise        Minute        1 92 % Room Air 94 bpm     2 92 % Room Air 96 bpm     3 92 % Room Air 95 bpm     4 93 % Room Air 97 bpm     5 93 % Room Air 101 bpm     6  93 % Room Air 97 bpm 144/75 3   Recovery        Minute        1 96 % Room Air 90 bpm     2 97 % Room Air 87 bpm     3 97 % Room Air 86 bpm     4 95 % Room Air 85 bpm 127/58 2     Six Minute Walk Summary  6MWT Status: completed without stopping  Patient Reported: No complaints     Interpretation:  Did the patient stop or pause?: No            Total Time Walked (Calculated): 360 seconds  Final Partial Lap Distance (feet): 100 feet  Total Distance Meters (Calculated): 335.28 meters  Predicted Distance Meters (Calculated): 429.82 meters  Percentage of Predicted (Calculated): 78  Peak VO2 (Calculated): 14.04  Mets: 4.01  Has The Patient Had a Previous Six Minute Walk Test?: No       Previous 6MWT Results  Has The Patient Had a Previous Six Minute Walk Test?: No           CLINICAL INTERPRETATION:  Six minute walk distance is 335.28m (78 % predicted) with light dyspnea.  During exercise, there was no significant desaturation while breathing room air.  Blood pressure remained stable and Heart rate remained stable with walking.  The patient did not report non-pulmonary symptoms during exercise.  The patient did complete the study, walking 360 seconds of the 360 second test.  Based upon age and body mass index, exercise capacity is normal.      [x] Mild exercise-induced hypoxemia described as an arterial oxygen saturation of " 93-95% (or 3-4% less than at rest)  []  Moderate exercise-induced hypoxemia as 89-93%  []  Severe exercise induced hypoxemia as < 89% O2 saturation.  Medicare Criteria for oxygen prescription comments:   []  When arterial oxygen saturation is at or below 88% during exercise (severe exercise induced hypoxemia) then the patient falls under Medicare Group 1 criteria for supplemental oxygen

## 2025-05-09 ENCOUNTER — RESULTS FOLLOW-UP (OUTPATIENT)
Dept: PULMONOLOGY | Facility: CLINIC | Age: 80
End: 2025-05-09

## 2025-05-28 LAB
OHS CV AF BURDEN PERCENT: < 1
OHS CV AF BURDEN PERCENT: < 1
OHS CV DC REMOTE DEVICE TYPE: NORMAL
OHS CV DC REMOTE DEVICE TYPE: NORMAL
OHS CV ICD SHOCK: NO
OHS CV ICD SHOCK: NO
OHS CV RV PACING PERCENT: 76.09 %
OHS CV RV PACING PERCENT: 81.26 %

## 2025-06-27 ENCOUNTER — OFFICE VISIT (OUTPATIENT)
Dept: PULMONOLOGY | Facility: CLINIC | Age: 80
End: 2025-06-27
Payer: MEDICARE

## 2025-06-27 VITALS
SYSTOLIC BLOOD PRESSURE: 122 MMHG | HEART RATE: 88 BPM | WEIGHT: 150.38 LBS | RESPIRATION RATE: 18 BRPM | DIASTOLIC BLOOD PRESSURE: 79 MMHG | BODY MASS INDEX: 24.27 KG/M2 | OXYGEN SATURATION: 96 %

## 2025-06-27 DIAGNOSIS — J84.112 UIP (USUAL INTERSTITIAL PNEUMONITIS): ICD-10-CM

## 2025-06-27 DIAGNOSIS — J84.9 INTERSTITIAL LUNG DISEASE: Primary | ICD-10-CM

## 2025-06-27 PROCEDURE — 99999 PR PBB SHADOW E&M-EST. PATIENT-LVL IV: CPT | Mod: PBBFAC,,, | Performed by: STUDENT IN AN ORGANIZED HEALTH CARE EDUCATION/TRAINING PROGRAM

## 2025-06-27 RX ORDER — TRAMADOL HYDROCHLORIDE 50 MG/1
50 TABLET, FILM COATED ORAL EVERY 8 HOURS PRN
COMMUNITY
Start: 2025-04-08

## 2025-06-27 NOTE — PROGRESS NOTES
Chief complaint:  Chief Complaint   Patient presents with    Sleep Apnea    Rev test        History of present illness -  Established clinic patient for moderate RED since 2023 compliant with CPAP therapy.  Who I initially saw in April 2025 for weight loss and chest imaging abnormalities.    Symptoms occasional shortness of breath  Tobacco use: Lifetime non-smoker stopped proximally 10 years ago, he has an approximate 30 pack-year history   Occupational history:  Noncontributory         Interval history   06/27/2025   Patient comes back for follow-up after completing extensive investigations for many pulmonary standpoint, see results below.  He is asymptomatic from a respiratory standpoint at the moment and doing well.    Physical examination -   Physical Exam  Vitals and nursing note reviewed.   Constitutional:       Appearance: Normal appearance.      Comments: Thin   HENT:      Head: Normocephalic and atraumatic.      Nose: Nose normal.      Mouth/Throat:      Mouth: Mucous membranes are moist.   Eyes:      Extraocular Movements: Extraocular movements intact.      Pupils: Pupils are equal, round, and reactive to light.   Cardiovascular:      Rate and Rhythm: Normal rate and regular rhythm.   Pulmonary:      Effort: Pulmonary effort is normal.      Breath sounds: Normal breath sounds.   Abdominal:      General: Abdomen is flat. Bowel sounds are normal.      Palpations: Abdomen is soft.   Musculoskeletal:         General: No swelling.      Right lower leg: Edema present.      Left lower leg: Edema present.   Skin:     General: Skin is warm.      Capillary Refill: Capillary refill takes less than 2 seconds.   Neurological:      General: No focal deficit present.      Mental Status: He is alert.        Vitals:    06/27/25 0951   BP: 122/79   Pulse: 88   Resp: 18   SpO2: 96%   Weight: 68.2 kg (150 lb 5.7 oz)      Review of Systems   Constitutional: Negative.    HENT: Negative.     Eyes: Negative.    Cardiovascular:  Negative.    Gastrointestinal: Negative.    Musculoskeletal: Negative.    Skin: Negative.    Neurological: Negative.        Relevant data (Independently reviewed by me) -    Prior notes -   Cardiology    Labs -   Absolute eosinophil count on blood work performed earlier this year was 180  CMP is essentially unremarkable.    Spirometry -  05/08/2025 basic physiology demonstrated a forced vital capacity of 3.9 L for 120% of predicted, FEV1 of 2.7 L for 108% of predicted normal ratio.  Increased % of predicted increased RV likely a combination of CPFE  05/08/2025 6 minute walk test was essentially normal able to covered 335 m    Imaging -   Reports available of an x-ray of 2023 which reports interstitial changes  05/08/2025 CT scan of the chest shows findings compatible with UIP with early interstitial reticulation, traction bronchiectasis, honeycombing and a clear gradient, he has anterior sign with the bulk of the disease in the anterior portions of the lungs.    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    No role for biopsy at the moment.    Functionally doing well as well as clinically, most of the abnormalities rest on the chest imaging.    We will follow him along with serial PFTs in CTs in 6 months, no role for antifibrotic therapy at the moment as the patient is asymptomatic.    RTC in 6 months    Addison Mayers   06/27/2025

## 2025-07-30 ENCOUNTER — CLINICAL SUPPORT (OUTPATIENT)
Dept: CARDIOLOGY | Facility: HOSPITAL | Age: 80
End: 2025-07-30

## 2025-07-30 ENCOUNTER — CLINICAL SUPPORT (OUTPATIENT)
Dept: CARDIOLOGY | Facility: HOSPITAL | Age: 80
End: 2025-07-30
Attending: INTERNAL MEDICINE
Payer: MEDICARE

## 2025-07-30 DIAGNOSIS — I49.5 SICK SINUS SYNDROME: ICD-10-CM

## 2025-07-30 DIAGNOSIS — Z95.0 PRESENCE OF CARDIAC PACEMAKER: ICD-10-CM

## 2025-07-30 PROCEDURE — 93296 REM INTERROG EVL PM/IDS: CPT | Performed by: INTERNAL MEDICINE

## 2025-07-30 PROCEDURE — 93294 REM INTERROG EVL PM/LDLS PM: CPT | Mod: S$GLB,,, | Performed by: INTERNAL MEDICINE

## 2025-08-04 LAB
OHS CV AF BURDEN PERCENT: < 1
OHS CV DC REMOTE DEVICE TYPE: NORMAL
OHS CV ICD SHOCK: NO
OHS CV RV PACING PERCENT: 84.53 %

## 2025-08-14 ENCOUNTER — TELEPHONE (OUTPATIENT)
Dept: INTERNAL MEDICINE | Facility: CLINIC | Age: 80
End: 2025-08-14
Payer: MEDICARE

## 2025-08-19 ENCOUNTER — OFFICE VISIT (OUTPATIENT)
Dept: INTERNAL MEDICINE | Facility: CLINIC | Age: 80
End: 2025-08-19
Payer: MEDICARE

## 2025-08-19 VITALS
WEIGHT: 154.13 LBS | HEART RATE: 80 BPM | BODY MASS INDEX: 24.77 KG/M2 | SYSTOLIC BLOOD PRESSURE: 118 MMHG | DIASTOLIC BLOOD PRESSURE: 70 MMHG | TEMPERATURE: 98 F | OXYGEN SATURATION: 96 % | HEIGHT: 66 IN

## 2025-08-19 DIAGNOSIS — I48.0 PAROXYSMAL ATRIAL FIBRILLATION: ICD-10-CM

## 2025-08-19 DIAGNOSIS — Z12.5 SCREENING FOR PROSTATE CANCER: ICD-10-CM

## 2025-08-19 DIAGNOSIS — I10 PRIMARY HYPERTENSION: ICD-10-CM

## 2025-08-19 DIAGNOSIS — Z87.891 HISTORY OF SMOKING 25-50 PACK YEARS: ICD-10-CM

## 2025-08-19 DIAGNOSIS — E78.2 MIXED HYPERLIPIDEMIA: ICD-10-CM

## 2025-08-19 DIAGNOSIS — I65.23 BILATERAL CAROTID ARTERY STENOSIS: ICD-10-CM

## 2025-08-19 DIAGNOSIS — G47.33 OSA (OBSTRUCTIVE SLEEP APNEA): Primary | ICD-10-CM

## 2025-08-19 DIAGNOSIS — Z95.0 CARDIAC PACEMAKER IN SITU: ICD-10-CM

## 2025-08-19 DIAGNOSIS — I73.9 PAD (PERIPHERAL ARTERY DISEASE): ICD-10-CM

## 2025-08-19 DIAGNOSIS — J84.9 INTERSTITIAL LUNG DISEASE: ICD-10-CM

## 2025-08-19 PROBLEM — I65.29 CAROTID ARTERY STENOSIS: Status: ACTIVE | Noted: 2022-01-31

## 2025-08-19 PROBLEM — Z86.79 HISTORY OF ATRIAL FIBRILLATION: Status: ACTIVE | Noted: 2023-04-20

## 2025-08-19 PROBLEM — Z89.421 HISTORY OF COMPLETE RAY AMPUTATION OF SECOND TOE OF RIGHT FOOT: Status: ACTIVE | Noted: 2023-04-20

## 2025-08-19 PROBLEM — M21.611 BUNION OF GREAT TOE OF RIGHT FOOT: Status: ACTIVE | Noted: 2025-08-19

## 2025-08-19 PROCEDURE — 1101F PT FALLS ASSESS-DOCD LE1/YR: CPT | Mod: CPTII,S$GLB,, | Performed by: PHYSICIAN ASSISTANT

## 2025-08-19 PROCEDURE — 3288F FALL RISK ASSESSMENT DOCD: CPT | Mod: CPTII,S$GLB,, | Performed by: PHYSICIAN ASSISTANT

## 2025-08-19 PROCEDURE — 1159F MED LIST DOCD IN RCRD: CPT | Mod: CPTII,S$GLB,, | Performed by: PHYSICIAN ASSISTANT

## 2025-08-19 PROCEDURE — 3078F DIAST BP <80 MM HG: CPT | Mod: CPTII,S$GLB,, | Performed by: PHYSICIAN ASSISTANT

## 2025-08-19 PROCEDURE — 3074F SYST BP LT 130 MM HG: CPT | Mod: CPTII,S$GLB,, | Performed by: PHYSICIAN ASSISTANT

## 2025-08-19 PROCEDURE — 1126F AMNT PAIN NOTED NONE PRSNT: CPT | Mod: CPTII,S$GLB,, | Performed by: PHYSICIAN ASSISTANT

## 2025-08-19 PROCEDURE — 99999 PR PBB SHADOW E&M-EST. PATIENT-LVL IV: CPT | Mod: PBBFAC,,, | Performed by: PHYSICIAN ASSISTANT

## 2025-08-19 PROCEDURE — 99204 OFFICE O/P NEW MOD 45 MIN: CPT | Mod: S$GLB,,, | Performed by: PHYSICIAN ASSISTANT

## 2025-08-22 ENCOUNTER — HOSPITAL ENCOUNTER (OUTPATIENT)
Dept: RADIOLOGY | Facility: HOSPITAL | Age: 80
Discharge: HOME | End: 2025-08-22
Attending: PHYSICIAN ASSISTANT
Payer: MEDICARE

## 2025-08-22 DIAGNOSIS — I65.23 BILATERAL CAROTID ARTERY STENOSIS: ICD-10-CM

## 2025-08-22 PROCEDURE — 93880 EXTRACRANIAL BILAT STUDY: CPT | Mod: 26,,, | Performed by: RADIOLOGY

## 2025-08-22 PROCEDURE — 93880 EXTRACRANIAL BILAT STUDY: CPT | Mod: TC

## 2025-08-25 ENCOUNTER — TELEPHONE (OUTPATIENT)
Dept: INTERNAL MEDICINE | Facility: CLINIC | Age: 80
End: 2025-08-25
Payer: MEDICARE

## 2025-08-25 ENCOUNTER — OFFICE VISIT (OUTPATIENT)
Dept: PODIATRY | Facility: CLINIC | Age: 80
End: 2025-08-25
Payer: MEDICARE

## 2025-08-25 VITALS — WEIGHT: 154.13 LBS | HEIGHT: 66 IN | BODY MASS INDEX: 24.77 KG/M2

## 2025-08-25 DIAGNOSIS — M20.41 HAMMER TOE OF RIGHT FOOT: Primary | ICD-10-CM

## 2025-08-25 DIAGNOSIS — Z89.421: ICD-10-CM

## 2025-08-25 PROCEDURE — 99999 PR PBB SHADOW E&M-EST. PATIENT-LVL III: CPT | Mod: PBBFAC,,, | Performed by: PODIATRIST

## 2025-08-28 ENCOUNTER — OFFICE VISIT (OUTPATIENT)
Dept: CARDIOLOGY | Facility: CLINIC | Age: 80
End: 2025-08-28
Payer: MEDICARE

## 2025-08-28 VITALS
DIASTOLIC BLOOD PRESSURE: 66 MMHG | WEIGHT: 153 LBS | SYSTOLIC BLOOD PRESSURE: 111 MMHG | OXYGEN SATURATION: 95 % | BODY MASS INDEX: 24.59 KG/M2 | HEART RATE: 76 BPM | HEIGHT: 66 IN

## 2025-08-28 DIAGNOSIS — Z86.79 HISTORY OF ATRIAL FIBRILLATION: ICD-10-CM

## 2025-08-28 DIAGNOSIS — I10 PRIMARY HYPERTENSION: ICD-10-CM

## 2025-08-28 DIAGNOSIS — Z95.0 HISTORY OF PERMANENT CARDIAC PACEMAKER PLACEMENT: ICD-10-CM

## 2025-08-28 DIAGNOSIS — I73.9 PAD (PERIPHERAL ARTERY DISEASE): ICD-10-CM

## 2025-08-28 DIAGNOSIS — Z95.0 CARDIAC PACEMAKER IN SITU: ICD-10-CM

## 2025-08-28 DIAGNOSIS — E78.2 MIXED HYPERLIPIDEMIA: ICD-10-CM

## 2025-08-28 DIAGNOSIS — I65.23 BILATERAL CAROTID ARTERY STENOSIS: ICD-10-CM

## 2025-08-28 DIAGNOSIS — I48.0 PAROXYSMAL ATRIAL FIBRILLATION: Primary | ICD-10-CM

## 2025-08-28 PROBLEM — R01.1 MURMUR, CARDIAC: Status: RESOLVED | Noted: 2025-02-25 | Resolved: 2025-08-28

## 2025-08-28 PROBLEM — I65.29 CAROTID ARTERY STENOSIS: Status: RESOLVED | Noted: 2022-01-31 | Resolved: 2025-08-28

## 2025-08-28 PROCEDURE — 1101F PT FALLS ASSESS-DOCD LE1/YR: CPT | Mod: CPTII,S$GLB,, | Performed by: INTERNAL MEDICINE

## 2025-08-28 PROCEDURE — 99999 PR PBB SHADOW E&M-EST. PATIENT-LVL III: CPT | Mod: PBBFAC,,, | Performed by: INTERNAL MEDICINE

## 2025-08-28 PROCEDURE — 1126F AMNT PAIN NOTED NONE PRSNT: CPT | Mod: CPTII,S$GLB,, | Performed by: INTERNAL MEDICINE

## 2025-08-28 PROCEDURE — 3074F SYST BP LT 130 MM HG: CPT | Mod: CPTII,S$GLB,, | Performed by: INTERNAL MEDICINE

## 2025-08-28 PROCEDURE — 3078F DIAST BP <80 MM HG: CPT | Mod: CPTII,S$GLB,, | Performed by: INTERNAL MEDICINE

## 2025-08-28 PROCEDURE — 3288F FALL RISK ASSESSMENT DOCD: CPT | Mod: CPTII,S$GLB,, | Performed by: INTERNAL MEDICINE

## 2025-08-28 PROCEDURE — 1159F MED LIST DOCD IN RCRD: CPT | Mod: CPTII,S$GLB,, | Performed by: INTERNAL MEDICINE

## 2025-08-28 PROCEDURE — 99214 OFFICE O/P EST MOD 30 MIN: CPT | Mod: S$GLB,,, | Performed by: INTERNAL MEDICINE

## 2025-08-28 PROCEDURE — 1160F RVW MEDS BY RX/DR IN RCRD: CPT | Mod: CPTII,S$GLB,, | Performed by: INTERNAL MEDICINE

## (undated) DEVICE — SYR BULB 60CC IRRIGATION

## (undated) DEVICE — DRESSING AQUACEL AG ADV 3.5X6

## (undated) DEVICE — GUIDEWIRE TSCF-35-50-3

## (undated) DEVICE — PACK PACER PERMANENT OMC

## (undated) DEVICE — SHEATH SAFE ULTRA 7FR

## (undated) DEVICE — SUT SILK 2-0 PS 18IN BLACK

## (undated) DEVICE — DRESSING ADH COVADERM PLUS 4X4

## (undated) DEVICE — CAUTERY BOVIE PENCIL

## (undated) DEVICE — SCALPEL SZ 10 RETRACTABLE

## (undated) DEVICE — PAD DEFIB CADENCE ADULT R2

## (undated) DEVICE — PAD GROUNDING DISPER ELECTRODE

## (undated) DEVICE — NDL PERCUTANEOUS 21G 7CM VASC

## (undated) DEVICE — ADHESIVE DERMABOND ADVANCED